# Patient Record
Sex: MALE | Race: WHITE | HISPANIC OR LATINO | URBAN - METROPOLITAN AREA
[De-identification: names, ages, dates, MRNs, and addresses within clinical notes are randomized per-mention and may not be internally consistent; named-entity substitution may affect disease eponyms.]

---

## 2023-10-22 ENCOUNTER — APPOINTMENT (OUTPATIENT)
Dept: RADIOLOGY | Facility: MEDICAL CENTER | Age: 63
End: 2023-10-22
Attending: EMERGENCY MEDICINE

## 2023-10-22 ENCOUNTER — HOSPITAL ENCOUNTER (EMERGENCY)
Facility: MEDICAL CENTER | Age: 63
End: 2023-10-22
Attending: EMERGENCY MEDICINE

## 2023-10-22 VITALS
HEART RATE: 67 BPM | WEIGHT: 184.3 LBS | TEMPERATURE: 98.4 F | RESPIRATION RATE: 15 BRPM | SYSTOLIC BLOOD PRESSURE: 142 MMHG | HEIGHT: 69 IN | OXYGEN SATURATION: 97 % | BODY MASS INDEX: 27.3 KG/M2 | DIASTOLIC BLOOD PRESSURE: 80 MMHG

## 2023-10-22 DIAGNOSIS — R33.9 URINARY RETENTION: ICD-10-CM

## 2023-10-22 DIAGNOSIS — N41.0 ACUTE PROSTATITIS: ICD-10-CM

## 2023-10-22 LAB
ALBUMIN SERPL BCP-MCNC: 4.3 G/DL (ref 3.2–4.9)
ALBUMIN/GLOB SERPL: 1.4 G/DL
ALP SERPL-CCNC: 156 U/L (ref 30–99)
ALT SERPL-CCNC: 44 U/L (ref 2–50)
ANION GAP SERPL CALC-SCNC: 10 MMOL/L (ref 7–16)
APPEARANCE UR: CLEAR
AST SERPL-CCNC: 37 U/L (ref 12–45)
BACTERIA #/AREA URNS HPF: NEGATIVE /HPF
BASOPHILS # BLD AUTO: 0.5 % (ref 0–1.8)
BASOPHILS # BLD: 0.05 K/UL (ref 0–0.12)
BILIRUB SERPL-MCNC: 0.5 MG/DL (ref 0.1–1.5)
BILIRUB UR QL STRIP.AUTO: ABNORMAL
BUN SERPL-MCNC: 17 MG/DL (ref 8–22)
CALCIUM ALBUM COR SERPL-MCNC: 8.9 MG/DL (ref 8.5–10.5)
CALCIUM SERPL-MCNC: 9.1 MG/DL (ref 8.5–10.5)
CHLORIDE SERPL-SCNC: 105 MMOL/L (ref 96–112)
CO2 SERPL-SCNC: 22 MMOL/L (ref 20–33)
COLOR UR: ABNORMAL
CREAT SERPL-MCNC: 0.56 MG/DL (ref 0.5–1.4)
EOSINOPHIL # BLD AUTO: 0.11 K/UL (ref 0–0.51)
EOSINOPHIL NFR BLD: 1.2 % (ref 0–6.9)
EPI CELLS #/AREA URNS HPF: NEGATIVE /HPF
ERYTHROCYTE [DISTWIDTH] IN BLOOD BY AUTOMATED COUNT: 44 FL (ref 35.9–50)
GFR SERPLBLD CREATININE-BSD FMLA CKD-EPI: 111 ML/MIN/1.73 M 2
GLOBULIN SER CALC-MCNC: 3 G/DL (ref 1.9–3.5)
GLUCOSE SERPL-MCNC: 113 MG/DL (ref 65–99)
GLUCOSE UR STRIP.AUTO-MCNC: NEGATIVE MG/DL
HCT VFR BLD AUTO: 42.2 % (ref 42–52)
HGB BLD-MCNC: 14.4 G/DL (ref 14–18)
HYALINE CASTS #/AREA URNS LPF: ABNORMAL /LPF
IMM GRANULOCYTES # BLD AUTO: 0.03 K/UL (ref 0–0.11)
IMM GRANULOCYTES NFR BLD AUTO: 0.3 % (ref 0–0.9)
KETONES UR STRIP.AUTO-MCNC: NEGATIVE MG/DL
LEUKOCYTE ESTERASE UR QL STRIP.AUTO: ABNORMAL
LYMPHOCYTES # BLD AUTO: 0.98 K/UL (ref 1–4.8)
LYMPHOCYTES NFR BLD: 10.6 % (ref 22–41)
MCH RBC QN AUTO: 30.6 PG (ref 27–33)
MCHC RBC AUTO-ENTMCNC: 34.1 G/DL (ref 32.3–36.5)
MCV RBC AUTO: 89.6 FL (ref 81.4–97.8)
MICRO URNS: ABNORMAL
MONOCYTES # BLD AUTO: 0.78 K/UL (ref 0–0.85)
MONOCYTES NFR BLD AUTO: 8.5 % (ref 0–13.4)
NEUTROPHILS # BLD AUTO: 7.28 K/UL (ref 1.82–7.42)
NEUTROPHILS NFR BLD: 78.9 % (ref 44–72)
NITRITE UR QL STRIP.AUTO: POSITIVE
NRBC # BLD AUTO: 0 K/UL
NRBC BLD-RTO: 0 /100 WBC (ref 0–0.2)
PH UR STRIP.AUTO: 5 [PH] (ref 5–8)
PLATELET # BLD AUTO: 293 K/UL (ref 164–446)
PMV BLD AUTO: 10.1 FL (ref 9–12.9)
POTASSIUM SERPL-SCNC: 4.1 MMOL/L (ref 3.6–5.5)
PROT SERPL-MCNC: 7.3 G/DL (ref 6–8.2)
PROT UR QL STRIP: NEGATIVE MG/DL
RBC # BLD AUTO: 4.71 M/UL (ref 4.7–6.1)
RBC # URNS HPF: ABNORMAL /HPF
RBC UR QL AUTO: ABNORMAL
SODIUM SERPL-SCNC: 137 MMOL/L (ref 135–145)
SP GR UR STRIP.AUTO: 1.02
UROBILINOGEN UR STRIP.AUTO-MCNC: 1 MG/DL
WBC # BLD AUTO: 9.2 K/UL (ref 4.8–10.8)
WBC #/AREA URNS HPF: ABNORMAL /HPF

## 2023-10-22 PROCEDURE — 87015 SPECIMEN INFECT AGNT CONCNTJ: CPT

## 2023-10-22 PROCEDURE — 700111 HCHG RX REV CODE 636 W/ 250 OVERRIDE (IP): Mod: JZ | Performed by: EMERGENCY MEDICINE

## 2023-10-22 PROCEDURE — 85025 COMPLETE CBC W/AUTO DIFF WBC: CPT

## 2023-10-22 PROCEDURE — 87040 BLOOD CULTURE FOR BACTERIA: CPT | Mod: 91

## 2023-10-22 PROCEDURE — 51798 US URINE CAPACITY MEASURE: CPT

## 2023-10-22 PROCEDURE — 51702 INSERT TEMP BLADDER CATH: CPT

## 2023-10-22 PROCEDURE — 81001 URINALYSIS AUTO W/SCOPE: CPT

## 2023-10-22 PROCEDURE — 87186 SC STD MICRODIL/AGAR DIL: CPT

## 2023-10-22 PROCEDURE — 80053 COMPREHEN METABOLIC PANEL: CPT

## 2023-10-22 PROCEDURE — 87077 CULTURE AEROBIC IDENTIFY: CPT

## 2023-10-22 PROCEDURE — 36415 COLL VENOUS BLD VENIPUNCTURE: CPT

## 2023-10-22 PROCEDURE — 96374 THER/PROPH/DIAG INJ IV PUSH: CPT

## 2023-10-22 PROCEDURE — 303105 HCHG CATHETER EXTRA

## 2023-10-22 PROCEDURE — 74176 CT ABD & PELVIS W/O CONTRAST: CPT

## 2023-10-22 PROCEDURE — 99284 EMERGENCY DEPT VISIT MOD MDM: CPT

## 2023-10-22 RX ORDER — CEFTRIAXONE 2 G/1
2000 INJECTION, POWDER, FOR SOLUTION INTRAMUSCULAR; INTRAVENOUS ONCE
Status: COMPLETED | OUTPATIENT
Start: 2023-10-22 | End: 2023-10-22

## 2023-10-22 RX ORDER — LEVOFLOXACIN 500 MG/1
500 TABLET, FILM COATED ORAL DAILY
Qty: 14 TABLET | Refills: 0 | Status: ON HOLD | OUTPATIENT
Start: 2023-10-22 | End: 2023-10-25

## 2023-10-22 RX ADMIN — CEFTRIAXONE SODIUM 2000 MG: 2 INJECTION, POWDER, FOR SOLUTION INTRAMUSCULAR; INTRAVENOUS at 05:33

## 2023-10-22 ASSESSMENT — PAIN DESCRIPTION - PAIN TYPE
TYPE: ACUTE PAIN
TYPE: ACUTE PAIN

## 2023-10-22 NOTE — ED TRIAGE NOTES
Chief Complaint   Patient presents with    Urinary Retention      used. 3-4 days of painful and difficult urination.  Patient provided with specimen cup, but repeatedly says he isn't able to get any urine out.         Patient educated on triage process. Informed to notified ED staff of change in symptoms.     Vitals:    10/22/23 0311   BP: (!) 170/91   Pulse: 77   Resp: 18   Temp: 36.2 °C (97.1 °F)   SpO2: 99%

## 2023-10-22 NOTE — ED NOTES
Bladder scan done at bedside per protocol. Pt straight cathed with sterile technique due to large volume retained in bladder.

## 2023-10-22 NOTE — ED NOTES
Inserted tenorio cathter with sterile technique. Tenorio care teaching provided to pt and pt's wife via Finnish language line . Pt verbalized understanding of teaching.

## 2023-10-22 NOTE — ED NOTES
Discharge teaching with paperwork regarding tenorio care and follow up with urology provided to pt. Pt verbalized understanding of teaching and all questions answered. Pt is A&Ox4 with stable vital signs, stable physical assessment, and PIV removed with catheter intact upon discharge. Pt ambulatory out of ED with steady gait with all personal belongings.

## 2023-10-22 NOTE — ED PROVIDER NOTES
ED Provider Note    Scribed for Polly Salas M.D. by Eva Paz. 10/22/2023, 3:59 AM.    Primary care provider: No primary care provider noted.  Means of arrival: Walk-In  History obtained from: Patient  History limited by: Botswanan    CHIEF COMPLAINT  Chief Complaint   Patient presents with    Urinary Retention      used. 3-4 days of painful and difficult urination.  Patient provided with specimen cup, but repeatedly says he isn't able to get any urine out.        HPI/ROS  Doyle Villa is a 63 y.o. male who presents to the Emergency Department for evaluation of acute urinary retention onset four days ago.  Patient states over the last few days he has had difficulty urinating and it is painful to urinate, he has a burning sensation.  He has been able to urinate a little bit at a time over the last few days.  However, beginning at around 11 PM last night, patient was unable to have any urine output. He tried taking Amoxicillin and Pyridium last night that he states he got from a friend who is a doctor. Despite medication, his symptoms continued to persist, prompting him to the ED today for further evaluation. Patient has associated dysuria. Denies any fevers, diarrhea, constipation, nausea, or vomiting. History of hypothyroidism. No known drug allergies.  No known medical problems.  He is otherwise generally healthy.    EXTERNAL RECORDS REVIEWED  No records to review.      LIMITATION TO HISTORY   Select: Language Botswanan,  Used     OUTSIDE HISTORIAN(S):  Significant other , who is at bedside and is able to help contribute to the patient's history      PAST MEDICAL HISTORY  History of hypothyroidism.     SURGICAL HISTORY  patient denies any surgical history    SOCIAL HISTORY  Social History     Tobacco Use    Smoking status: Every Day     Current packs/day: 0.50     Average packs/day: 0.5 packs/day for 15.0 years (7.5 ttl pk-yrs)     Types: Cigarettes     Start date:  "10/10/2008    Smokeless tobacco: Never   Substance Use Topics    Alcohol use: Yes     Alcohol/week: 2.4 oz     Types: 4 Cans of beer per week    Drug use: Never      Social History     Substance and Sexual Activity   Drug Use Never       FAMILY HISTORY  History reviewed. No pertinent family history.    CURRENT MEDICATIONS  Home Medications       Reviewed by Jayla Short R.N. (Registered Nurse) on 10/22/23 at 0324  Med List Status: Partial     Medication Last Dose Status        Patient Vish Taking any Medications                           ALLERGIES  No Known Allergies    PHYSICAL EXAM  VITAL SIGNS: BP (!) 170/91   Pulse 77   Temp 36.2 °C (97.1 °F) (Temporal)   Resp 18   Ht 1.75 m (5' 8.9\")   Wt 83.6 kg (184 lb 4.9 oz)   SpO2 99%   BMI 27.30 kg/m²   Vitals reviewed by myself.  Nursing note and vitals reviewed.  Constitutional: Well-developed and well-nourished.  Patient appears uncomfortable  HENT: Head is normocephalic and atraumatic.  Eyes: extra-ocular movements intact  Cardiovascular: Regular rate and regular rhythm. No murmur heard.  Pulmonary/Chest: Breath sounds normal. No wheezes or rales.   Abdominal: Soft with mild suprapubic tenderness, no rebound or guarding  Musculoskeletal: Extremities exhibit normal range of motion without edema or tenderness.   Neurological: Awake and alert  Skin: Skin is warm and dry. No rash.       DIAGNOSTIC STUDIES:  LABS  Labs Reviewed   CBC WITH DIFFERENTIAL - Abnormal; Notable for the following components:       Result Value    Neutrophils-Polys 78.90 (*)     Lymphocytes 10.60 (*)     Lymphs (Absolute) 0.98 (*)     All other components within normal limits   COMP METABOLIC PANEL - Abnormal; Notable for the following components:    Glucose 113 (*)     Alkaline Phosphatase 156 (*)     All other components within normal limits   URINALYSIS,CULTURE IF INDICATED - Abnormal; Notable for the following components:    Color Red (*)     Bilirubin Small (*)     Nitrite " "Positive (*)     Leukocyte Esterase Moderate (*)     Occult Blood Trace (*)     All other components within normal limits    Narrative:     Indication for culture:->Unexplained new onset of Flank pain  and/or Costovertebral angle tenderness  Release to patient->Immediate   URINE MICROSCOPIC (W/UA) - Abnormal; Notable for the following components:    WBC 0-2 (*)     RBC 5-10 (*)     All other components within normal limits    Narrative:     Indication for culture:->Unexplained new onset of Flank pain  and/or Costovertebral angle tenderness  Release to patient->Immediate   ESTIMATED GFR   BLOOD CULTURE    Narrative:     Per Hospital Policy: Only change Specimen Src: to \"Line\" if  specified by physician order.  Release to patient->Immediate   BLOOD CULTURE    Narrative:     Per Hospital Policy: Only change Specimen Src: to \"Line\" if  specified by physician order.  Release to patient->Immediate       All labs reviewed and independently interpreted by myself      RADIOLOGY    Final interpretation by radiology demonstrates:    CT-RENAL COLIC EVALUATION(A/P W/O)   Final Result         1. No urinary tract calculus identified. No renal collecting system dilatation.      2. Enlarged prostate. Fat stranding surrounding the seminal vesicles. Correlate for infection or inflammation.   3. Partially decompressed urinary bladder. No wall thickening.        The radiologist's interpretation of all radiological studies have been reviewed by me.      COURSE & MEDICAL DECISION MAKING    ED Observation Status? Yes; I am placing the patient in to an observation status due to a diagnostic uncertainty as well as therapeutic intensity. Patient placed in observation status at 4:01 AM, 10/22/2023.     Observation plan is as follows: We will manage their symptoms, evaluate with diagnostic testing, and then reassess after results are reviewed     Upon Reevaluation, the patient's condition has: Improved; and will be discharged.    Patient " discharged from ED Observation status at 7:11 AM (Time) 10/22/2023 (Date).     INITIAL ASSESSMENT AND PLAN    Patient is a 63-year-old male who presents for evaluation of abdominal discomfort and difficulty urinating.  Differential diagnosis includes urinary tract infection, benign prostatic hypertrophy, nephrolithiasis, pyelonephritis, TITA.  Diagnostic work-up includes labs and renal CT.       REASSESSMENTS   3:58 AM - Patient was seen and evaluated at bedside. Patient presents to the ED for urinary retention. After my exam, I discussed with the patient the plan of care, which includes treating the patient with medication for their symptoms, as well as obtaining lab work and imaging for further evaluation. Patient understands and verbalizes agreement to plan of care.     4:08 AM - Nurse preformed a bladder scan at bedside which showed 565 mL of urine.  Straight cath was performed which resolved patient's discomfort    6:31 AM - Patient was reevaluated at bedside. Discussed lab and radiology results with the patient and informed that they were consistent with prostatitis. I updated him on the plan of care, which includes having him try and urinate on his own. If he is unable to, we will have to place a tenorio catheter in. I will also give him a referral to Urology. I instructed him to stop taking the Amoxicillin. Patient understands and verbalizes agreement to plan of care.       7:11 AM - Patient was reevaluated at bedside. He has no further questions or concerns at this time. I then informed the patient of my plan for discharge, which includes strict return precautions for any new or worsening symptoms. Patient understands and verbalizes agreement to plan of care. Patient is comfortable going home at this time.        ER COURSE AND FINAL DISPOSITION   Patient's initial vitals notable for hypertension likely secondary to discomfort.  He has some mild suprapubic tenderness on exam.  Bladder scan reveals retained  urine of 565 mL and therefore straight cath was performed which resolved patient's discomfort.    Labs returned and are independently interpreted by myself to demonstrate:  -Urinalysis is nitrite positive with bacteria and leukocyte esterase, this is consistent with infection patient is given a dose of Rocephin in the emergency department.  -No leukocytosis  -Renal function is within normal limits  Labs otherwise unremarkable    CT returns and demonstrates enlarged prostate and fat stranding around the seminal vesicles consistent with likely infection.  Given his symptomatology I am concerned about prostatitis.  I will start him on Levaquin for 14 days.  He has been unable to urinate in the emergency department since initial straight cath and therefore we will also place a Burch catheter.  I will have him follow-up with urology for ongoing symptom management and Burch catheter removal.  Patient is amenable to this plan.  He is given strict return precautions and discharged in stable condition.    ADDITIONAL PROBLEM LIST AND RESOURCE UTILIZATION    Additional problems aside from the chief complaint that I have addressed: none    I have discussed management of the patient with the following physicians and LITLTE's: None    Discussion of management with other [facility] or appropriate source(s): None     Escalation of care considered, and ultimately not performed: see above.     Barriers to care at this time, including but not limited to: Patient does not have established PCP.     Decision tools and prescription drugs considered including, but not limited to: see above.    Please see review of records as noted above    Patient will be discharged home.    FOLLOW UP:  [facility]  [address] [id number]  [phone]  Schedule an appointment as soon as possible for a visit in 3 days        OUTPATIENT MEDICATIONS:  New Prescriptions    LEVOFLOXACIN (LEVAQUIN) 500 MG TABLET    Take 1 Tablet by mouth  every day for 14 days.         FINAL IMPRESSION  1. Urinary retention    2. Acute prostatitis          Eva GREEN (Scribe), am scribing for, and in the presence of, Polly Salas M.D..    Electronically signed by: Eva Paz (Scribe), 10/22/2023    Polly GREEN M.D. personally performed the services described in this documentation, as scribed by Eva Paz in my presence, and it is both accurate and complete.    The note accurately reflects work and decisions made by me.  Polly Salas M.D.  10/22/2023  12:49 PM

## 2023-10-23 ENCOUNTER — HOSPITAL ENCOUNTER (INPATIENT)
Facility: MEDICAL CENTER | Age: 63
LOS: 2 days | DRG: 690 | End: 2023-10-25
Attending: EMERGENCY MEDICINE | Admitting: STUDENT IN AN ORGANIZED HEALTH CARE EDUCATION/TRAINING PROGRAM

## 2023-10-23 ENCOUNTER — APPOINTMENT (OUTPATIENT)
Dept: CARDIOLOGY | Facility: MEDICAL CENTER | Age: 63
DRG: 690 | End: 2023-10-23
Attending: STUDENT IN AN ORGANIZED HEALTH CARE EDUCATION/TRAINING PROGRAM

## 2023-10-23 DIAGNOSIS — R78.81 POSITIVE BLOOD CULTURE: ICD-10-CM

## 2023-10-23 DIAGNOSIS — N39.0 URINARY TRACT INFECTION WITHOUT HEMATURIA, SITE UNSPECIFIED: ICD-10-CM

## 2023-10-23 DIAGNOSIS — N39.0 ACUTE UTI: ICD-10-CM

## 2023-10-23 DIAGNOSIS — E03.9 HYPOTHYROIDISM, UNSPECIFIED TYPE: ICD-10-CM

## 2023-10-23 DIAGNOSIS — R78.81 BACTEREMIA: ICD-10-CM

## 2023-10-23 DIAGNOSIS — N40.0 PROSTATE ENLARGEMENT: ICD-10-CM

## 2023-10-23 LAB
ALBUMIN SERPL BCP-MCNC: 4.4 G/DL (ref 3.2–4.9)
ALBUMIN/GLOB SERPL: 1.3 G/DL
ALP SERPL-CCNC: 162 U/L (ref 30–99)
ALT SERPL-CCNC: 44 U/L (ref 2–50)
ANION GAP SERPL CALC-SCNC: 12 MMOL/L (ref 7–16)
APPEARANCE UR: ABNORMAL
AST SERPL-CCNC: 31 U/L (ref 12–45)
BACTERIA #/AREA URNS HPF: NEGATIVE /HPF
BASOPHILS # BLD AUTO: 0.7 % (ref 0–1.8)
BASOPHILS # BLD: 0.05 K/UL (ref 0–0.12)
BILIRUB SERPL-MCNC: 0.4 MG/DL (ref 0.1–1.5)
BILIRUB UR QL STRIP.AUTO: NEGATIVE
BUN SERPL-MCNC: 17 MG/DL (ref 8–22)
CALCIUM ALBUM COR SERPL-MCNC: 9.3 MG/DL (ref 8.5–10.5)
CALCIUM SERPL-MCNC: 9.6 MG/DL (ref 8.5–10.5)
CAOX CRY #/AREA URNS HPF: ABNORMAL /HPF
CHLORIDE SERPL-SCNC: 104 MMOL/L (ref 96–112)
CK SERPL-CCNC: 106 U/L (ref 0–154)
CO2 SERPL-SCNC: 22 MMOL/L (ref 20–33)
COLOR UR: YELLOW
CREAT SERPL-MCNC: 0.72 MG/DL (ref 0.5–1.4)
EOSINOPHIL # BLD AUTO: 0.03 K/UL (ref 0–0.51)
EOSINOPHIL NFR BLD: 0.4 % (ref 0–6.9)
EPI CELLS #/AREA URNS HPF: NEGATIVE /HPF
ERYTHROCYTE [DISTWIDTH] IN BLOOD BY AUTOMATED COUNT: 45 FL (ref 35.9–50)
GFR SERPLBLD CREATININE-BSD FMLA CKD-EPI: 103 ML/MIN/1.73 M 2
GLOBULIN SER CALC-MCNC: 3.3 G/DL (ref 1.9–3.5)
GLUCOSE SERPL-MCNC: 123 MG/DL (ref 65–99)
GLUCOSE UR STRIP.AUTO-MCNC: NEGATIVE MG/DL
HCT VFR BLD AUTO: 48 % (ref 42–52)
HGB BLD-MCNC: 15.9 G/DL (ref 14–18)
HYALINE CASTS #/AREA URNS LPF: ABNORMAL /LPF
IMM GRANULOCYTES # BLD AUTO: 0.03 K/UL (ref 0–0.11)
IMM GRANULOCYTES NFR BLD AUTO: 0.4 % (ref 0–0.9)
KETONES UR STRIP.AUTO-MCNC: ABNORMAL MG/DL
LACTATE SERPL-SCNC: 1.3 MMOL/L (ref 0.5–2)
LEUKOCYTE ESTERASE UR QL STRIP.AUTO: NEGATIVE
LV EJECT FRACT  99904: 65
LV EJECT FRACT MOD 2C 99903: 69.44
LV EJECT FRACT MOD 4C 99902: 58.89
LV EJECT FRACT MOD BP 99901: 63.4
LYMPHOCYTES # BLD AUTO: 1.27 K/UL (ref 1–4.8)
LYMPHOCYTES NFR BLD: 18.2 % (ref 22–41)
MAGNESIUM SERPL-MCNC: 2.2 MG/DL (ref 1.5–2.5)
MCH RBC QN AUTO: 30.2 PG (ref 27–33)
MCHC RBC AUTO-ENTMCNC: 33.1 G/DL (ref 32.3–36.5)
MCV RBC AUTO: 91.3 FL (ref 81.4–97.8)
MICRO URNS: ABNORMAL
MONOCYTES # BLD AUTO: 0.9 K/UL (ref 0–0.85)
MONOCYTES NFR BLD AUTO: 12.9 % (ref 0–13.4)
NEUTROPHILS # BLD AUTO: 4.68 K/UL (ref 1.82–7.42)
NEUTROPHILS NFR BLD: 67.4 % (ref 44–72)
NITRITE UR QL STRIP.AUTO: NEGATIVE
NRBC # BLD AUTO: 0 K/UL
NRBC BLD-RTO: 0 /100 WBC (ref 0–0.2)
PH UR STRIP.AUTO: 5 [PH] (ref 5–8)
PHOSPHATE SERPL-MCNC: 3.2 MG/DL (ref 2.5–4.5)
PLATELET # BLD AUTO: 320 K/UL (ref 164–446)
PMV BLD AUTO: 9.4 FL (ref 9–12.9)
POTASSIUM SERPL-SCNC: 4.2 MMOL/L (ref 3.6–5.5)
PROCALCITONIN SERPL-MCNC: 0.27 NG/ML
PROT SERPL-MCNC: 7.7 G/DL (ref 6–8.2)
PROT UR QL STRIP: 30 MG/DL
RBC # BLD AUTO: 5.26 M/UL (ref 4.7–6.1)
RBC # URNS HPF: ABNORMAL /HPF
RBC UR QL AUTO: ABNORMAL
SODIUM SERPL-SCNC: 138 MMOL/L (ref 135–145)
SP GR UR STRIP.AUTO: 1.03
T4 FREE SERPL-MCNC: 0.83 NG/DL (ref 0.93–1.7)
TSH SERPL DL<=0.005 MIU/L-ACNC: 13.2 UIU/ML (ref 0.38–5.33)
UROBILINOGEN UR STRIP.AUTO-MCNC: 0.2 MG/DL
WBC # BLD AUTO: 7 K/UL (ref 4.8–10.8)
WBC #/AREA URNS HPF: ABNORMAL /HPF

## 2023-10-23 PROCEDURE — 82550 ASSAY OF CK (CPK): CPT

## 2023-10-23 PROCEDURE — 700111 HCHG RX REV CODE 636 W/ 250 OVERRIDE (IP): Mod: JZ | Performed by: STUDENT IN AN ORGANIZED HEALTH CARE EDUCATION/TRAINING PROGRAM

## 2023-10-23 PROCEDURE — 85025 COMPLETE CBC W/AUTO DIFF WBC: CPT

## 2023-10-23 PROCEDURE — 87040 BLOOD CULTURE FOR BACTERIA: CPT | Mod: 91

## 2023-10-23 PROCEDURE — 36415 COLL VENOUS BLD VENIPUNCTURE: CPT

## 2023-10-23 PROCEDURE — 93306 TTE W/DOPPLER COMPLETE: CPT | Mod: 26 | Performed by: INTERNAL MEDICINE

## 2023-10-23 PROCEDURE — 700105 HCHG RX REV CODE 258: Performed by: STUDENT IN AN ORGANIZED HEALTH CARE EDUCATION/TRAINING PROGRAM

## 2023-10-23 PROCEDURE — 83605 ASSAY OF LACTIC ACID: CPT

## 2023-10-23 PROCEDURE — 81001 URINALYSIS AUTO W/SCOPE: CPT

## 2023-10-23 PROCEDURE — 84439 ASSAY OF FREE THYROXINE: CPT

## 2023-10-23 PROCEDURE — 80053 COMPREHEN METABOLIC PANEL: CPT

## 2023-10-23 PROCEDURE — 87086 URINE CULTURE/COLONY COUNT: CPT

## 2023-10-23 PROCEDURE — 700102 HCHG RX REV CODE 250 W/ 637 OVERRIDE(OP): Performed by: STUDENT IN AN ORGANIZED HEALTH CARE EDUCATION/TRAINING PROGRAM

## 2023-10-23 PROCEDURE — 84100 ASSAY OF PHOSPHORUS: CPT

## 2023-10-23 PROCEDURE — 84145 PROCALCITONIN (PCT): CPT

## 2023-10-23 PROCEDURE — 84443 ASSAY THYROID STIM HORMONE: CPT

## 2023-10-23 PROCEDURE — 770006 HCHG ROOM/CARE - MED/SURG/GYN SEMI*

## 2023-10-23 PROCEDURE — 83735 ASSAY OF MAGNESIUM: CPT

## 2023-10-23 PROCEDURE — A9270 NON-COVERED ITEM OR SERVICE: HCPCS | Performed by: STUDENT IN AN ORGANIZED HEALTH CARE EDUCATION/TRAINING PROGRAM

## 2023-10-23 PROCEDURE — 99223 1ST HOSP IP/OBS HIGH 75: CPT | Performed by: STUDENT IN AN ORGANIZED HEALTH CARE EDUCATION/TRAINING PROGRAM

## 2023-10-23 PROCEDURE — 99285 EMERGENCY DEPT VISIT HI MDM: CPT

## 2023-10-23 PROCEDURE — 700111 HCHG RX REV CODE 636 W/ 250 OVERRIDE (IP): Mod: JZ | Performed by: INTERNAL MEDICINE

## 2023-10-23 PROCEDURE — 96365 THER/PROPH/DIAG IV INF INIT: CPT

## 2023-10-23 PROCEDURE — 700105 HCHG RX REV CODE 258: Performed by: INTERNAL MEDICINE

## 2023-10-23 PROCEDURE — 93306 TTE W/DOPPLER COMPLETE: CPT

## 2023-10-23 RX ORDER — SODIUM CHLORIDE 9 MG/ML
INJECTION, SOLUTION INTRAVENOUS CONTINUOUS
Status: ACTIVE | OUTPATIENT
Start: 2023-10-23 | End: 2023-10-24

## 2023-10-23 RX ORDER — ENOXAPARIN SODIUM 100 MG/ML
40 INJECTION SUBCUTANEOUS DAILY
Status: DISCONTINUED | OUTPATIENT
Start: 2023-10-23 | End: 2023-10-25 | Stop reason: HOSPADM

## 2023-10-23 RX ORDER — HYDROMORPHONE HYDROCHLORIDE 1 MG/ML
0.5 INJECTION, SOLUTION INTRAMUSCULAR; INTRAVENOUS; SUBCUTANEOUS EVERY 6 HOURS PRN
Status: ACTIVE | OUTPATIENT
Start: 2023-10-23 | End: 2023-10-25

## 2023-10-23 RX ORDER — LEVOTHYROXINE SODIUM 0.1 MG/1
100 TABLET ORAL
Status: DISCONTINUED | OUTPATIENT
Start: 2023-10-23 | End: 2023-10-25 | Stop reason: HOSPADM

## 2023-10-23 RX ORDER — PROCHLORPERAZINE EDISYLATE 5 MG/ML
5-10 INJECTION INTRAMUSCULAR; INTRAVENOUS EVERY 4 HOURS PRN
Status: DISCONTINUED | OUTPATIENT
Start: 2023-10-23 | End: 2023-10-25 | Stop reason: HOSPADM

## 2023-10-23 RX ORDER — ONDANSETRON 2 MG/ML
4 INJECTION INTRAMUSCULAR; INTRAVENOUS EVERY 4 HOURS PRN
Status: DISCONTINUED | OUTPATIENT
Start: 2023-10-23 | End: 2023-10-25 | Stop reason: HOSPADM

## 2023-10-23 RX ORDER — BISACODYL 10 MG
10 SUPPOSITORY, RECTAL RECTAL
Status: DISCONTINUED | OUTPATIENT
Start: 2023-10-23 | End: 2023-10-25 | Stop reason: HOSPADM

## 2023-10-23 RX ORDER — POLYETHYLENE GLYCOL 3350 17 G/17G
1 POWDER, FOR SOLUTION ORAL
Status: DISCONTINUED | OUTPATIENT
Start: 2023-10-23 | End: 2023-10-25 | Stop reason: HOSPADM

## 2023-10-23 RX ORDER — LABETALOL HYDROCHLORIDE 5 MG/ML
10 INJECTION, SOLUTION INTRAVENOUS EVERY 4 HOURS PRN
Status: DISCONTINUED | OUTPATIENT
Start: 2023-10-23 | End: 2023-10-25 | Stop reason: HOSPADM

## 2023-10-23 RX ORDER — ACETAMINOPHEN 325 MG/1
650 TABLET ORAL EVERY 6 HOURS PRN
Status: DISCONTINUED | OUTPATIENT
Start: 2023-10-23 | End: 2023-10-25 | Stop reason: HOSPADM

## 2023-10-23 RX ORDER — CEFTRIAXONE 2 G/1
2000 INJECTION, POWDER, FOR SOLUTION INTRAMUSCULAR; INTRAVENOUS ONCE
Status: DISCONTINUED | OUTPATIENT
Start: 2023-10-23 | End: 2023-10-23

## 2023-10-23 RX ORDER — TAMSULOSIN HYDROCHLORIDE 0.4 MG/1
0.4 CAPSULE ORAL
Status: DISCONTINUED | OUTPATIENT
Start: 2023-10-23 | End: 2023-10-25 | Stop reason: HOSPADM

## 2023-10-23 RX ORDER — PROMETHAZINE HYDROCHLORIDE 25 MG/1
12.5-25 TABLET ORAL EVERY 4 HOURS PRN
Status: DISCONTINUED | OUTPATIENT
Start: 2023-10-23 | End: 2023-10-25 | Stop reason: HOSPADM

## 2023-10-23 RX ORDER — PROMETHAZINE HYDROCHLORIDE 25 MG/1
12.5-25 SUPPOSITORY RECTAL EVERY 4 HOURS PRN
Status: DISCONTINUED | OUTPATIENT
Start: 2023-10-23 | End: 2023-10-25 | Stop reason: HOSPADM

## 2023-10-23 RX ORDER — AMOXICILLIN 250 MG
2 CAPSULE ORAL 2 TIMES DAILY
Status: DISCONTINUED | OUTPATIENT
Start: 2023-10-23 | End: 2023-10-25 | Stop reason: HOSPADM

## 2023-10-23 RX ORDER — ONDANSETRON 4 MG/1
4 TABLET, ORALLY DISINTEGRATING ORAL EVERY 4 HOURS PRN
Status: DISCONTINUED | OUTPATIENT
Start: 2023-10-23 | End: 2023-10-25 | Stop reason: HOSPADM

## 2023-10-23 RX ADMIN — LEVOTHYROXINE SODIUM 100 MCG: 0.1 TABLET ORAL at 14:51

## 2023-10-23 RX ADMIN — TAMSULOSIN HYDROCHLORIDE 0.4 MG: 0.4 CAPSULE ORAL at 14:51

## 2023-10-23 RX ADMIN — CEFEPIME 2 G: 2 INJECTION, POWDER, FOR SOLUTION INTRAVENOUS at 12:37

## 2023-10-23 RX ADMIN — ENOXAPARIN SODIUM 40 MG: 100 INJECTION SUBCUTANEOUS at 18:41

## 2023-10-23 RX ADMIN — SODIUM CHLORIDE: 9 INJECTION, SOLUTION INTRAVENOUS at 14:37

## 2023-10-23 ASSESSMENT — COGNITIVE AND FUNCTIONAL STATUS - GENERAL
DAILY ACTIVITIY SCORE: 24
SUGGESTED CMS G CODE MODIFIER DAILY ACTIVITY: CH
SUGGESTED CMS G CODE MODIFIER MOBILITY: CH
MOBILITY SCORE: 24

## 2023-10-23 ASSESSMENT — ENCOUNTER SYMPTOMS
SHORTNESS OF BREATH: 0
COUGH: 0

## 2023-10-23 ASSESSMENT — LIFESTYLE VARIABLES
CONSUMPTION TOTAL: NEGATIVE
HOW MANY TIMES IN THE PAST YEAR HAVE YOU HAD 5 OR MORE DRINKS IN A DAY: 0
HAVE PEOPLE ANNOYED YOU BY CRITICIZING YOUR DRINKING: NO
DOES PATIENT WANT TO STOP DRINKING: NO
ON A TYPICAL DAY WHEN YOU DRINK ALCOHOL HOW MANY DRINKS DO YOU HAVE: 0
AVERAGE NUMBER OF DAYS PER WEEK YOU HAVE A DRINK CONTAINING ALCOHOL: 0
HAVE YOU EVER FELT YOU SHOULD CUT DOWN ON YOUR DRINKING: NO
ALCOHOL_USE: NO
TOTAL SCORE: 0
EVER HAD A DRINK FIRST THING IN THE MORNING TO STEADY YOUR NERVES TO GET RID OF A HANGOVER: NO
TOTAL SCORE: 0
EVER FELT BAD OR GUILTY ABOUT YOUR DRINKING: NO
TOTAL SCORE: 0

## 2023-10-23 ASSESSMENT — FIBROSIS 4 INDEX
FIB4 SCORE: 0.91
FIB4 SCORE: 1.18

## 2023-10-23 ASSESSMENT — PATIENT HEALTH QUESTIONNAIRE - PHQ9
2. FEELING DOWN, DEPRESSED, IRRITABLE, OR HOPELESS: NOT AT ALL
1. LITTLE INTEREST OR PLEASURE IN DOING THINGS: NOT AT ALL
SUM OF ALL RESPONSES TO PHQ9 QUESTIONS 1 AND 2: 0

## 2023-10-23 ASSESSMENT — PAIN DESCRIPTION - PAIN TYPE: TYPE: ACUTE PAIN

## 2023-10-23 NOTE — ED NOTES
ECHO to bedside    Niacinamide Pregnancy And Lactation Text: These medications are considered safe during pregnancy.

## 2023-10-23 NOTE — PROGRESS NOTES
4 Eyes Skin Assessment Completed by TRACEE Le and TRACEE Weiner.    Head WDL  Ears WDL  Nose WDL  Mouth WDL  Neck WDL  Breast/Chest WDL  Shoulder Blades WDL  Spine WDL  (R) Arm/Elbow/Hand WDL  (L) Arm/Elbow/Hand WDL  Abdomen Scar  Groin WDL  Scrotum/Coccyx/Buttocks WDL  (R) Leg WDL  (L) Leg WDL  (R) Heel/Foot/Toe WDL  (L) Heel/Foot/Toe WDL          Devices In Places Burch      Interventions In Place Pillows    Possible Skin Injury No    Pictures Uploaded Into Epic N/A  Wound Consult Placed N/A  RN Wound Prevention Protocol Ordered Yes

## 2023-10-23 NOTE — ASSESSMENT & PLAN NOTE
1/2 blood culture growing Serratia marcescens.  Continue IV fluid  Continue IV cefepime  Follow repeat blood culture

## 2023-10-23 NOTE — ED TRIAGE NOTES
Chief Complaint   Patient presents with    Groin Pain     Pt to triage with complaints of worsening groin pain. Pt reports to have Urology follow up but pain worse and was sent to [facility] for further eval.     used for triage. Pt states to have had catheter placed on Sat, was called today by Urology and told to come to [facility] for further eval, possible infection. Pt states to be having pressure in his bladder.   Explained to pt triage process, made pt aware to tell this RN/staff of any changes/concerns, pt verbalized understanding of process and instructions given. Pt to ER lucian.

## 2023-10-23 NOTE — ED PROVIDER NOTES
"  ER Provider Note    Scribed for Trev Michelle M.D. by Kaylah Pacheco. 10/23/2023   11:11 AM    Primary Care Provider: Pcp Pt States None    CHIEF COMPLAINT  Chief Complaint   Patient presents with    Groin Pain     Pt to triage with complaints of worsening groin pain. Pt reports to have Urology follow up but pain worse and was sent to [facility] for further eval.      EXTERNAL RECORDS REVIEWED  Outpatient Notes: The patient was seen yesterday for urinary retention at that time he had blood cultures drawn. 1 of 2 was positive for gram negative rods, patient was called and asked to return to the ED.     HPI/ROS  LIMITATION TO HISTORY   Select: : None  OUTSIDE HISTORIAN(S):  Family Doyle Villa is a 63 y.o. male who presents to the ED for evaluation of mild groin pain. Patient had catheter placed after having urinary retention. Patient states called today to present to the ED for abnormal labs. He reports having pressure/cramping in his bladder. He adds that he has been on antibiotics and overall feels better. No alleviating factors attempted. He denies any vomiting    PAST MEDICAL HISTORY  History reviewed. No pertinent past medical history.    SURGICAL HISTORY  History reviewed. No pertinent surgical history.    FAMILY HISTORY  None noted    SOCIAL HISTORY   reports that he has been smoking cigarettes. He started smoking about 15 years ago. He has a 7.5 pack-year smoking history. He has never used smokeless tobacco. He reports current alcohol use of about 2.4 oz of alcohol per week. He reports that he does not use drugs.    CURRENT MEDICATIONS  Previous Medications    LEVOFLOXACIN (LEVAQUIN) 500 MG TABLET    Take 1 Tablet by mouth every day for 14 days.       ALLERGIES  No Known Allergies     PHYSICAL EXAM  BP (!) 149/93   Pulse 91   Temp 36.3 °C (97.3 °F) (Temporal)   Resp 18   Ht 1.727 m (5' 8\")   Wt 83.5 kg (184 lb)   SpO2 96%   BMI 27.98 kg/m²      Nursing note and vitals " reviewed.  Constitutional: Well-developed and well-nourished. No distress.   HENT: Head is normocephalic and atraumatic. Oropharynx is clear and moist without exudate or erythema.   Eyes: Pupils are equal, round, and reactive to light. Conjunctiva are normal.   Cardiovascular: Normal rate and regular rhythm. No murmur heard. Normal radial pulses.  Pulmonary/Chest: Breath sounds normal. No wheezes or rales.   Abdominal: Soft and non-tender. No distention, tenorio catheter in place, draining clear yellow urine  Musculoskeletal: Extremities exhibit normal range of motion without edema or tenderness.   Neurological: Awake, alert and oriented to person, place, and time. No focal deficits noted.  Skin: Skin is warm and dry. No rash.   Psychiatric: Normal mood and affect. Appropriate for clinical situation    DIAGNOSTIC STUDIES    Labs:   Results for orders placed or performed during the hospital encounter of 10/23/23   LACTIC ACID   Result Value Ref Range    Lactic Acid 1.3 0.5 - 2.0 mmol/L   CBC WITH DIFFERENTIAL   Result Value Ref Range    WBC 7.0 4.8 - 10.8 K/uL    RBC 5.26 4.70 - 6.10 M/uL    Hemoglobin 15.9 14.0 - 18.0 g/dL    Hematocrit 48.0 42.0 - 52.0 %    MCV 91.3 81.4 - 97.8 fL    MCH 30.2 27.0 - 33.0 pg    MCHC 33.1 32.3 - 36.5 g/dL    RDW 45.0 35.9 - 50.0 fL    Platelet Count 320 164 - 446 K/uL    MPV 9.4 9.0 - 12.9 fL    Neutrophils-Polys 67.40 44.00 - 72.00 %    Lymphocytes 18.20 (L) 22.00 - 41.00 %    Monocytes 12.90 0.00 - 13.40 %    Eosinophils 0.40 0.00 - 6.90 %    Basophils 0.70 0.00 - 1.80 %    Immature Granulocytes 0.40 0.00 - 0.90 %    Nucleated RBC 0.00 0.00 - 0.20 /100 WBC    Neutrophils (Absolute) 4.68 1.82 - 7.42 K/uL    Lymphs (Absolute) 1.27 1.00 - 4.80 K/uL    Monos (Absolute) 0.90 (H) 0.00 - 0.85 K/uL    Eos (Absolute) 0.03 0.00 - 0.51 K/uL    Baso (Absolute) 0.05 0.00 - 0.12 K/uL    Immature Granulocytes (abs) 0.03 0.00 - 0.11 K/uL    NRBC (Absolute) 0.00 K/uL   COMP METABOLIC PANEL   Result  Value Ref Range    Sodium 138 135 - 145 mmol/L    Potassium 4.2 3.6 - 5.5 mmol/L    Chloride 104 96 - 112 mmol/L    Co2 22 20 - 33 mmol/L    Anion Gap 12.0 7.0 - 16.0    Glucose 123 (H) 65 - 99 mg/dL    Bun 17 8 - 22 mg/dL    Creatinine 0.72 0.50 - 1.40 mg/dL    Calcium 9.6 8.5 - 10.5 mg/dL    Correct Calcium 9.3 8.5 - 10.5 mg/dL    AST(SGOT) 31 12 - 45 U/L    ALT(SGPT) 44 2 - 50 U/L    Alkaline Phosphatase 162 (H) 30 - 99 U/L    Total Bilirubin 0.4 0.1 - 1.5 mg/dL    Albumin 4.4 3.2 - 4.9 g/dL    Total Protein 7.7 6.0 - 8.2 g/dL    Globulin 3.3 1.9 - 3.5 g/dL    A-G Ratio 1.3 g/dL   URINALYSIS    Specimen: Urine   Result Value Ref Range    Color Yellow     Character Cloudy (A)     Specific Gravity 1.028 <1.035    Ph 5.0 5.0 - 8.0    Glucose Negative Negative mg/dL    Ketones Trace (A) Negative mg/dL    Protein 30 (A) Negative mg/dL    Bilirubin Negative Negative    Urobilinogen, Urine 0.2 Negative    Nitrite Negative Negative    Leukocyte Esterase Negative Negative    Occult Blood Large (A) Negative    Micro Urine Req Microscopic    ESTIMATED GFR   Result Value Ref Range    GFR (CKD-EPI) 103 >60 mL/min/1.73 m 2   MAGNESIUM   Result Value Ref Range    Magnesium 2.2 1.5 - 2.5 mg/dL   PHOSPHORUS   Result Value Ref Range    Phosphorus 3.2 2.5 - 4.5 mg/dL   CREATINE KINASE   Result Value Ref Range    CPK Total 106 0 - 154 U/L   TSH WITH REFLEX TO FT4   Result Value Ref Range    TSH 13.200 (H) 0.380 - 5.330 uIU/mL   URINE MICROSCOPIC (W/UA)   Result Value Ref Range    WBC 2-5 (A) /hpf    RBC 5-10 (A) /hpf    Bacteria Negative None /hpf    Epithelial Cells Negative /hpf    Ca Oxalate Crystal Moderate /hpf    Hyaline Cast 0-2 /lpf       INITIAL ASSESSMENT AND PLAN    11:11 AM - Patient was evaluated at bedside for groin pain. Ordered for Lactic Acid, CBC with diff, CMP, UA, UA Culture and Blood Culture to evaluate. I informed him of the plan for hospitalization for IV antibiotics. The patient will be medicated with  Rocephin 2,000 mg for his symptoms. Patient verbalizes understanding and support with my plan of care. The patient returns to the ED with bacteremia and positive blood culture. The patient will be treated with antibiotics as per pharmacy recommendation.    ED Observation Status? No; Patient does not meet criteria for ED Observation.      COURSE AND MEDICAL DECISION MAKING    11:43 AM - I discussed the patient's case and the above findings with Dr. Cespedes (hospitalist) who will consult on the patient for hospitalization.    Patient presents today with a history of urinary retention urinary tract infection.  Blood cultures positive for gram-negative bacteria consistent with bacteremia.  I do not hear any murmur on exam to make me concerned for endocarditis.    DISPOSITION AND DISCUSSIONS    I have discussed management of the patient with the following physicians and LITTLE's:  None noted    Discussion of management with other [facility] or appropriate source(s): None     DISPOSITION:  Patient will be hospitalized by Dr. Cespedes in guarded condition.    FINAL DIAGNOSIS  1. Bacteremia    2. Urinary tract infection without hematuria, site unspecified    3. Positive blood culture         IKaylah (Patrice), am scribing for, and in the presence of, Trev Michelle M.D..    Electronically signed by: Kaylah Pacheco (Omaribclement), 10/23/2023    ITrev M.D. personally performed the services described in this documentation, as scribed by Kaylah Pacheco in my presence, and it is both accurate and complete.      The note accurately reflects work and decisions made by me.  Trev Michelle M.D.  10/23/2023  3:16 PM

## 2023-10-23 NOTE — H&P
Hospital Medicine History & Physical Note    Date of Service  10/23/2023    Primary Care Physician  Pcp Pt States None      Code Status  Full Code    Chief Complaint  Chief Complaint   Patient presents with    Groin Pain     Pt to triage with complaints of worsening groin pain. Pt reports to have Urology follow up but pain worse and was sent to [facility] for further eval.        History of Presenting Illness  Doyle Villa is a 63 y.o. male with past medical history of hypothyroidism who presented 10/23/2023 for evaluation of bacteremia.  Patient presented on 10/22 to ED with UTI and urine retention.  Patient was discharged in the ED on Burch and levofloxacin.  Today he received call from pharmacist for bacteremia blood culture growing gram-negative rose.  Patient does report of dysuria.  Denies fever, chest pain, cough, shortness of breath.    On arrival to ED his vitals remained stable.  Labs reviewed noted normal white count, UA positive for UTI. 1/2 blood culture growing Serratia marcescens.  CT renal noted with enlarged prostate,Fat stranding surrounding the seminal vesicles.      I spoke and discussed the case with the ER physician, Dr. Michelle   Patient will be admitted to the hospital for further evaluation and management for acute UTI and bacteremia.  He will require IV antibiotics and blood culture monitoring.  Eventual need ID evaluation.   Need voiding trial during hospitalization        I discussed the plan of care with patient and family.    Review of Systems  Review of Systems   Constitutional:  Positive for malaise/fatigue.   Respiratory:  Negative for cough and shortness of breath.    Cardiovascular:  Negative for chest pain.   Genitourinary:  Positive for dysuria, frequency, hematuria and urgency.       Past Medical History   has no past medical history on file.    Surgical History   has no past surgical history on file.     Family History  family history is not on file.   Family history  reviewed with patient. There is no family history that is pertinent to the chief complaint.     Social History   reports that he has been smoking cigarettes. He started smoking about 15 years ago. He has a 7.5 pack-year smoking history. He has never used smokeless tobacco. He reports current alcohol use of about 2.4 oz of alcohol per week. He reports that he does not use drugs.    Allergies  No Known Allergies    Medications  Prior to Admission Medications   Prescriptions Last Dose Informant Patient Reported? Taking?   levoFLOXacin (LEVAQUIN) 500 MG tablet   No No   Sig: Take 1 Tablet by mouth every day for 14 days.      Facility-Administered Medications: None       Physical Exam  Temp:  [36.3 °C (97.3 °F)] 36.3 °C (97.3 °F)  Pulse:  [91] 91  Resp:  [18] 18  BP: (149)/(93) 149/93  SpO2:  [96 %] 96 %  Blood Pressure: (!) 149/93   Temperature: 36.3 °C (97.3 °F)   Pulse: 91   Respiration: 18   Pulse Oximetry: 96 %       Physical Exam  Constitutional:       Appearance: He is ill-appearing.   Cardiovascular:      Rate and Rhythm: Normal rate and regular rhythm.      Pulses: Normal pulses.      Heart sounds: Normal heart sounds.   Pulmonary:      Effort: Pulmonary effort is normal.      Breath sounds: Normal breath sounds.   Abdominal:      General: Abdomen is flat.   Musculoskeletal:      Right lower leg: No edema.      Left lower leg: No edema.   Skin:     General: Skin is warm.   Neurological:      General: No focal deficit present.      Mental Status: He is alert.   Psychiatric:         Mood and Affect: Mood normal.         Laboratory:  Recent Labs     10/22/23  0412 10/23/23  1111   WBC 9.2 7.0   RBC 4.71 5.26   HEMOGLOBIN 14.4 15.9   HEMATOCRIT 42.2 48.0   MCV 89.6 91.3   MCH 30.6 30.2   MCHC 34.1 33.1   RDW 44.0 45.0   PLATELETCT 293 320   MPV 10.1 9.4     Recent Labs     10/22/23  0412 10/23/23  1111   SODIUM 137 138   POTASSIUM 4.1 4.2   CHLORIDE 105 104   CO2 22 22   GLUCOSE 113* 123*   BUN 17 17   CREATININE  "0.56 0.72   CALCIUM 9.1 9.6     Recent Labs     10/22/23  0412 10/23/23  1111   ALTSGPT 44 44   ASTSGOT 37 31   ALKPHOSPHAT 156* 162*   TBILIRUBIN 0.5 0.4   GLUCOSE 113* 123*         No results for input(s): \"NTPROBNP\" in the last 72 hours.      No results for input(s): \"TROPONINT\" in the last 72 hours.    Imaging:  EC-ECHOCARDIOGRAM COMPLETE W/O CONT    (Results Pending)       no X-Ray or EKG requiring interpretation    Assessment/Plan:  Justification for Admission Status  I anticipate this patient will require at least two midnights for appropriate medical management, necessitating inpatient admission   or further evaluation and management for acute UTI and bacteremia.  He will require IV antibiotics and blood culture monitoring.  Eventual need ID evaluation. Need voiding trial during hospitalization      * Bacteremia- (present on admission)  Assessment & Plan  1/2 blood culture growing Serratia marcescens.  Continue IV fluid  Continue Rocephin  Follow repeat blood culture      Hypothyroidism  Assessment & Plan  Started on levothyroxine  Check thyroid panel    Prostate enlargement  Assessment & Plan  Currently on Burch  Added Flomax  Voiding trial during hospitalization    Acute UTI  Assessment & Plan  Continue Rocephin  Follow urine culture  Requiring IV narcotics for pain management  Monitor for respiratory while on IV narcotics        VTE prophylaxis: SCDs/TEDs and enoxaparin ppx    I independently reviewed pertinent clinical lab tests since admission and ordered additional follow up clinical lab tests.  Admission order was placed.  Labs ordered for follow-up.  I independently reviewed pertinent radiographic images and the radiologist's reports since admission and ordered additional follow up radiographic studies.  The details of the available patient records in [facility] (including laboratory tests, culture data, medications, imaging, and other pertinent diagnostic tests) and that information was utilized as " warranted in today's medical decision making for this patient.  I personally evaluated the patient condition at bedside.     Care interventions include:   Review of interval medical and surgical history, current medications and outpatient medication reconciliation, interval imaging studies and radiologist interpretation, and interval laboratory values.

## 2023-10-23 NOTE — CONSULTS
"ID consult for empiric abx recommendation  UTI with gram neg bacteremia  Bcx +Serratia  Of note given levo as outpatient and \"feels better\"  Switch to cefepime  Final antibiotic recommendations per culture results and clinical course  DW Dr Hendrix      "

## 2023-10-23 NOTE — ED NOTES
Urine collected. Pt complaining of urine leaking around his catheter. Attempted to reposition catheter. Bladder scan of 34ml noted. Emptied catheter bag and will observe urine output.

## 2023-10-23 NOTE — ASSESSMENT & PLAN NOTE
Continue IV cefepime  Follow urine culture  Requiring IV narcotics for pain management  Monitor for respiratory while on IV narcotics

## 2023-10-24 LAB
ANION GAP SERPL CALC-SCNC: 9 MMOL/L (ref 7–16)
BACTERIA BLD CULT: ABNORMAL
BACTERIA BLD CULT: ABNORMAL
BUN SERPL-MCNC: 17 MG/DL (ref 8–22)
CALCIUM SERPL-MCNC: 9.1 MG/DL (ref 8.5–10.5)
CHLORIDE SERPL-SCNC: 107 MMOL/L (ref 96–112)
CO2 SERPL-SCNC: 25 MMOL/L (ref 20–33)
CREAT SERPL-MCNC: 0.77 MG/DL (ref 0.5–1.4)
ERYTHROCYTE [DISTWIDTH] IN BLOOD BY AUTOMATED COUNT: 44.7 FL (ref 35.9–50)
GFR SERPLBLD CREATININE-BSD FMLA CKD-EPI: 101 ML/MIN/1.73 M 2
GLUCOSE SERPL-MCNC: 108 MG/DL (ref 65–99)
HCT VFR BLD AUTO: 45.1 % (ref 42–52)
HGB BLD-MCNC: 14.7 G/DL (ref 14–18)
MCH RBC QN AUTO: 29.8 PG (ref 27–33)
MCHC RBC AUTO-ENTMCNC: 32.6 G/DL (ref 32.3–36.5)
MCV RBC AUTO: 91.3 FL (ref 81.4–97.8)
PLATELET # BLD AUTO: 303 K/UL (ref 164–446)
PMV BLD AUTO: 9.4 FL (ref 9–12.9)
POTASSIUM SERPL-SCNC: 4.7 MMOL/L (ref 3.6–5.5)
RBC # BLD AUTO: 4.94 M/UL (ref 4.7–6.1)
SIGNIFICANT IND 70042: ABNORMAL
SITE SITE: ABNORMAL
SODIUM SERPL-SCNC: 141 MMOL/L (ref 135–145)
SOURCE SOURCE: ABNORMAL
WBC # BLD AUTO: 6.3 K/UL (ref 4.8–10.8)

## 2023-10-24 PROCEDURE — 99233 SBSQ HOSP IP/OBS HIGH 50: CPT | Performed by: INTERNAL MEDICINE

## 2023-10-24 PROCEDURE — 700105 HCHG RX REV CODE 258: Performed by: STUDENT IN AN ORGANIZED HEALTH CARE EDUCATION/TRAINING PROGRAM

## 2023-10-24 PROCEDURE — A9270 NON-COVERED ITEM OR SERVICE: HCPCS | Performed by: STUDENT IN AN ORGANIZED HEALTH CARE EDUCATION/TRAINING PROGRAM

## 2023-10-24 PROCEDURE — 700102 HCHG RX REV CODE 250 W/ 637 OVERRIDE(OP): Performed by: STUDENT IN AN ORGANIZED HEALTH CARE EDUCATION/TRAINING PROGRAM

## 2023-10-24 PROCEDURE — 770006 HCHG ROOM/CARE - MED/SURG/GYN SEMI*

## 2023-10-24 PROCEDURE — 80048 BASIC METABOLIC PNL TOTAL CA: CPT

## 2023-10-24 PROCEDURE — 700111 HCHG RX REV CODE 636 W/ 250 OVERRIDE (IP): Mod: JZ | Performed by: INTERNAL MEDICINE

## 2023-10-24 PROCEDURE — 36415 COLL VENOUS BLD VENIPUNCTURE: CPT

## 2023-10-24 PROCEDURE — 700105 HCHG RX REV CODE 258: Performed by: INTERNAL MEDICINE

## 2023-10-24 PROCEDURE — 85027 COMPLETE CBC AUTOMATED: CPT

## 2023-10-24 RX ADMIN — LEVOTHYROXINE SODIUM 100 MCG: 0.1 TABLET ORAL at 04:41

## 2023-10-24 RX ADMIN — SODIUM CHLORIDE: 9 INJECTION, SOLUTION INTRAVENOUS at 10:41

## 2023-10-24 RX ADMIN — DOCUSATE SODIUM 50 MG AND SENNOSIDES 8.6 MG 2 TABLET: 8.6; 5 TABLET, FILM COATED ORAL at 04:41

## 2023-10-24 RX ADMIN — SODIUM CHLORIDE: 9 INJECTION, SOLUTION INTRAVENOUS at 00:22

## 2023-10-24 RX ADMIN — TAMSULOSIN HYDROCHLORIDE 0.4 MG: 0.4 CAPSULE ORAL at 08:53

## 2023-10-24 RX ADMIN — CEFEPIME 2 G: 2 INJECTION, POWDER, FOR SOLUTION INTRAVENOUS at 04:45

## 2023-10-24 RX ADMIN — CEFEPIME 2 G: 2 INJECTION, POWDER, FOR SOLUTION INTRAVENOUS at 17:44

## 2023-10-24 NOTE — PROGRESS NOTES
Received report from Cristina EASLEY. Assumed care at 0700  Patient a & o x 4. Pt [place] speaking.  ipad used.   Tenorio for retention.   Pt denies burning at tenorio site, no abd/groin pain and pt with good urine OP of clear, yellow urine.  Pt Tolerating regular diet  no skin breakdown  Patient up self. Gait steady.  Patient oriented to room, surroundings and call bell. Bed alarm off. Bed in lowest position, locked and upper side rails up. Patient demonstrated correct use of call bell. Call bell/belongings within reach. Patient educated on hourly rounding.   Plan   IV abx; infection prevention

## 2023-10-24 NOTE — CARE PLAN
The patient is Stable - Low risk of patient condition declining or worsening         Progress made toward(s) clinical / shift: Pt admitted from ED this afternoon. AXOX4. Vitals stable, ambulates with steady gait. Denies pain or discomfort. Came from home with MD jona messaged to clarify if he wants to remove or place order. MD would like to keep jona in place. Pt resting in bed with call light in reach.    Patient is not progressing towards the following goals:

## 2023-10-24 NOTE — CARE PLAN
Problem: Communication  Goal: The ability to communicate needs accurately and effectively will improve  Outcome: Progressing  Flowsheets (Taken 10/24/2023 1600)  Communication:   Assessed patient's ability to understand and communicate   Oriented family/support system to call light   Reoriented patient to environment   Oriented patient to call light   Utilized /language line     Problem: Infection - Standard  Goal: Patient will remain free from infection  Outcome: Progressing   The patient is Stable - Low risk of patient condition declining or worsening    Shift Goals  Clinical Goals: iv abx; Indonesian interpretor  Patient Goals: poc  Family Goals: poc    Progress made toward(s) clinical / shift goals:  used for education and medications. IV abx as ordered. Pt afebrile. Pt denies fever/chills. Good hand hygiene before and after pt contact.     Patient is not progressing towards the following goals:

## 2023-10-24 NOTE — CARE PLAN
The patient is Stable - Low risk of patient condition declining or worsening    Shift Goals  Clinical Goals: Urinary care  Patient Goals: Rest    Progress made toward(s) clinical / shift goals: Pt has tenorio leg bag in place; tenorio clean dry, and intact.       Problem: Knowledge Deficit - Standard  Goal: Patient and family/care givers will demonstrate understanding of plan of care, disease process/condition, diagnostic tests and medications  Description: Target End Date:  1-3 days or as soon as patient condition allows    Document in Patient Education    1.  Patient and family/caregiver oriented to unit, equipment, visitation policy and means for communicating concern  2.  Complete/review Learning Assessment  3.  Assess knowledge level of disease process/condition, treatment plan, diagnostic tests and medications  4.  Explain disease process/condition, treatment plan, diagnostic tests and medications  Outcome: Progressing       Patient is not progressing towards the following goals:

## 2023-10-25 ENCOUNTER — PHARMACY VISIT (OUTPATIENT)
Dept: PHARMACY | Facility: MEDICAL CENTER | Age: 63
End: 2023-10-25
Payer: COMMERCIAL

## 2023-10-25 VITALS
TEMPERATURE: 97 F | SYSTOLIC BLOOD PRESSURE: 113 MMHG | BODY MASS INDEX: 26.66 KG/M2 | DIASTOLIC BLOOD PRESSURE: 65 MMHG | RESPIRATION RATE: 16 BRPM | OXYGEN SATURATION: 95 % | HEART RATE: 57 BPM | HEIGHT: 68 IN | WEIGHT: 175.93 LBS

## 2023-10-25 PROBLEM — N39.0 ACUTE UTI: Status: RESOLVED | Noted: 2023-10-23 | Resolved: 2023-10-25

## 2023-10-25 PROBLEM — R78.81 BACTEREMIA: Status: RESOLVED | Noted: 2023-10-23 | Resolved: 2023-10-25

## 2023-10-25 LAB
ANION GAP SERPL CALC-SCNC: 11 MMOL/L (ref 7–16)
BACTERIA UR CULT: NORMAL
BUN SERPL-MCNC: 15 MG/DL (ref 8–22)
CALCIUM SERPL-MCNC: 9 MG/DL (ref 8.5–10.5)
CHLORIDE SERPL-SCNC: 103 MMOL/L (ref 96–112)
CO2 SERPL-SCNC: 24 MMOL/L (ref 20–33)
CREAT SERPL-MCNC: 0.77 MG/DL (ref 0.5–1.4)
EKG IMPRESSION: NORMAL
ERYTHROCYTE [DISTWIDTH] IN BLOOD BY AUTOMATED COUNT: 43.8 FL (ref 35.9–50)
GFR SERPLBLD CREATININE-BSD FMLA CKD-EPI: 101 ML/MIN/1.73 M 2
GLUCOSE SERPL-MCNC: 99 MG/DL (ref 65–99)
HCT VFR BLD AUTO: 46.2 % (ref 42–52)
HGB BLD-MCNC: 15.1 G/DL (ref 14–18)
MCH RBC QN AUTO: 29.7 PG (ref 27–33)
MCHC RBC AUTO-ENTMCNC: 32.7 G/DL (ref 32.3–36.5)
MCV RBC AUTO: 90.8 FL (ref 81.4–97.8)
PLATELET # BLD AUTO: 324 K/UL (ref 164–446)
PMV BLD AUTO: 9 FL (ref 9–12.9)
POTASSIUM SERPL-SCNC: 4.3 MMOL/L (ref 3.6–5.5)
RBC # BLD AUTO: 5.09 M/UL (ref 4.7–6.1)
SIGNIFICANT IND 70042: NORMAL
SITE SITE: NORMAL
SODIUM SERPL-SCNC: 138 MMOL/L (ref 135–145)
SOURCE SOURCE: NORMAL
WBC # BLD AUTO: 7.2 K/UL (ref 4.8–10.8)

## 2023-10-25 PROCEDURE — 93005 ELECTROCARDIOGRAM TRACING: CPT | Performed by: INTERNAL MEDICINE

## 2023-10-25 PROCEDURE — RXMED WILLOW AMBULATORY MEDICATION CHARGE: Performed by: INTERNAL MEDICINE

## 2023-10-25 PROCEDURE — 36415 COLL VENOUS BLD VENIPUNCTURE: CPT

## 2023-10-25 PROCEDURE — A9270 NON-COVERED ITEM OR SERVICE: HCPCS | Performed by: STUDENT IN AN ORGANIZED HEALTH CARE EDUCATION/TRAINING PROGRAM

## 2023-10-25 PROCEDURE — 93010 ELECTROCARDIOGRAM REPORT: CPT | Performed by: INTERNAL MEDICINE

## 2023-10-25 PROCEDURE — 700111 HCHG RX REV CODE 636 W/ 250 OVERRIDE (IP): Mod: JZ | Performed by: INTERNAL MEDICINE

## 2023-10-25 PROCEDURE — 700105 HCHG RX REV CODE 258: Performed by: INTERNAL MEDICINE

## 2023-10-25 PROCEDURE — 85027 COMPLETE CBC AUTOMATED: CPT

## 2023-10-25 PROCEDURE — 99255 IP/OBS CONSLTJ NEW/EST HI 80: CPT | Performed by: INTERNAL MEDICINE

## 2023-10-25 PROCEDURE — 99239 HOSP IP/OBS DSCHRG MGMT >30: CPT | Performed by: INTERNAL MEDICINE

## 2023-10-25 PROCEDURE — 700102 HCHG RX REV CODE 250 W/ 637 OVERRIDE(OP): Performed by: STUDENT IN AN ORGANIZED HEALTH CARE EDUCATION/TRAINING PROGRAM

## 2023-10-25 PROCEDURE — 80048 BASIC METABOLIC PNL TOTAL CA: CPT

## 2023-10-25 RX ORDER — TAMSULOSIN HYDROCHLORIDE 0.4 MG/1
0.4 CAPSULE ORAL
Qty: 30 CAPSULE | Refills: 0 | Status: SHIPPED | OUTPATIENT
Start: 2023-10-26

## 2023-10-25 RX ORDER — LEVOTHYROXINE SODIUM 0.1 MG/1
100 TABLET ORAL
Qty: 10 TABLET | Refills: 0 | Status: SHIPPED | OUTPATIENT
Start: 2023-10-26 | End: 2023-11-05

## 2023-10-25 RX ORDER — LEVOTHYROXINE SODIUM 0.1 MG/1
100 TABLET ORAL
Qty: 30 TABLET | Refills: 0 | Status: SHIPPED | OUTPATIENT
Start: 2023-10-26 | End: 2023-10-25 | Stop reason: SDUPTHER

## 2023-10-25 RX ORDER — LEVOFLOXACIN 750 MG/1
750 TABLET, FILM COATED ORAL DAILY
Qty: 14 TABLET | Refills: 0 | Status: ACTIVE | OUTPATIENT
Start: 2023-10-25 | End: 2023-11-08

## 2023-10-25 RX ADMIN — CEFEPIME 2 G: 2 INJECTION, POWDER, FOR SOLUTION INTRAVENOUS at 04:20

## 2023-10-25 RX ADMIN — TAMSULOSIN HYDROCHLORIDE 0.4 MG: 0.4 CAPSULE ORAL at 08:06

## 2023-10-25 RX ADMIN — LEVOTHYROXINE SODIUM 100 MCG: 0.1 TABLET ORAL at 04:19

## 2023-10-25 ASSESSMENT — ENCOUNTER SYMPTOMS
ABDOMINAL PAIN: 0
MYALGIAS: 0
WEAKNESS: 0
CHILLS: 0
NAUSEA: 0
SPUTUM PRODUCTION: 0
FEVER: 0
NERVOUS/ANXIOUS: 0
COUGH: 0
DIARRHEA: 0
DOUBLE VISION: 0
FLANK PAIN: 0
CONSTIPATION: 0
SHORTNESS OF BREATH: 0
BLURRED VISION: 0
VOMITING: 0

## 2023-10-25 ASSESSMENT — PATIENT HEALTH QUESTIONNAIRE - PHQ9
SUM OF ALL RESPONSES TO PHQ9 QUESTIONS 1 AND 2: 0
2. FEELING DOWN, DEPRESSED, IRRITABLE, OR HOPELESS: NOT AT ALL
1. LITTLE INTEREST OR PLEASURE IN DOING THINGS: NOT AT ALL

## 2023-10-25 ASSESSMENT — PAIN DESCRIPTION - PAIN TYPE: TYPE: ACUTE PAIN

## 2023-10-25 NOTE — CARE PLAN
The patient is Stable - Low risk of patient condition declining or worsening    Shift Goals  Clinical Goals: iv abx  Patient Goals: rest  Family Goals: poc    Progress made toward(s) clinical / shift goals:  no falls'; iv abx    Patient is not progressing towards the following goals:

## 2023-10-25 NOTE — PROGRESS NOTES
Hospital Medicine Daily Progress Note    Date of Service  10/24/2023    Chief Complaint  Doyle Villa is a 63 y.o. male admitted 10/23/2023 with evaluation of bacteremia     Hospital Course  Doyle Villa is a 63 y.o. male with past medical history of hypothyroidism who presented 10/23/2023 for evaluation of bacteremia.  Patient presented on 10/22 to ED with UTI and urine retention.  Patient was discharged in the ED on Burch and levofloxacin.  Today he received call from pharmacist for bacteremia blood culture growing gram-negative rose.  Patient does report of dysuria.  Denies fever, chest pain, cough, shortness of breath.     On arrival to ED his vitals remained stable.  Labs reviewed noted normal white count, UA positive for UTI. 1/2 blood culture growing Serratia marcescens.  CT renal noted with enlarged prostate,Fat stranding surrounding the seminal vesicles.    Interval Problem Update  Patient reports improvement of his symptoms.  His urine culture is growing Serratia, sensitivity report is pending.  Continue IV cefepime at this time.  Patient's TSH was elevated at 13.2 and free T4 low at 0.83, patient has been started on Synthroid 100 mcg daily.  2D echo reveals LVEF 65% with no significant valvular abnormalities.  Definite vegetations are not identified.  Continue to follow blood cultures.  Plan of care discussed with patient and wife at bedside.    I have discussed this patient's plan of care and discharge plan at IDT rounds today with Case Management, Nursing, Nursing leadership, and other members of the IDT team.    Consultants/Specialty  infectious disease    Code Status  Full Code    Disposition  The patient is not medically cleared for discharge to home or a post-acute facility.      I have placed the appropriate orders for post-discharge needs.    Review of Systems  Review of Systems   Constitutional:  Positive for malaise/fatigue.        Physical Exam  Temp:  [36.1 °C (97 °F)-36.6  °C (97.8 °F)] 36.4 °C (97.5 °F)  Pulse:  [53-60] 53  Resp:  [18] 18  BP: (122-136)/(70-78) 133/78  SpO2:  [95 %-96 %] 95 %    Physical Exam  Vitals and nursing note reviewed.   Constitutional:       General: He is not in acute distress.  HENT:      Head: Normocephalic.      Mouth/Throat:      Mouth: Mucous membranes are moist.   Eyes:      Pupils: Pupils are equal, round, and reactive to light.   Cardiovascular:      Rate and Rhythm: Normal rate and regular rhythm.      Pulses: Normal pulses.      Heart sounds: Normal heart sounds.   Pulmonary:      Effort: Pulmonary effort is normal.      Breath sounds: Normal breath sounds.   Abdominal:      Palpations: Abdomen is soft.      Tenderness: There is no abdominal tenderness.   Musculoskeletal:         General: No swelling.      Cervical back: Neck supple.   Skin:     General: Skin is warm.      Coloration: Skin is not jaundiced.   Neurological:      General: No focal deficit present.      Mental Status: He is alert and oriented to person, place, and time.   Psychiatric:         Mood and Affect: Mood normal.         Behavior: Behavior normal.         Fluids    Intake/Output Summary (Last 24 hours) at 10/24/2023 1735  Last data filed at 10/24/2023 1100  Gross per 24 hour   Intake 351.16 ml   Output 1000 ml   Net -648.84 ml       Laboratory  Recent Labs     10/22/23  0412 10/23/23  1111 10/24/23  0358   WBC 9.2 7.0 6.3   RBC 4.71 5.26 4.94   HEMOGLOBIN 14.4 15.9 14.7   HEMATOCRIT 42.2 48.0 45.1   MCV 89.6 91.3 91.3   MCH 30.6 30.2 29.8   MCHC 34.1 33.1 32.6   RDW 44.0 45.0 44.7   PLATELETCT 293 320 303   MPV 10.1 9.4 9.4     Recent Labs     10/22/23  0412 10/23/23  1111 10/24/23  0358   SODIUM 137 138 141   POTASSIUM 4.1 4.2 4.7   CHLORIDE 105 104 107   CO2 22 22 25   GLUCOSE 113* 123* 108*   BUN 17 17 17   CREATININE 0.56 0.72 0.77   CALCIUM 9.1 9.6 9.1                   Imaging  EC-ECHOCARDIOGRAM COMPLETE W/O CONT   Final Result           Assessment/Plan  * Bacteremia-  (present on admission)  Assessment & Plan  1/2 blood culture growing Serratia marcescens.  Continue IV fluid  Continue IV cefepime  Follow repeat blood culture      Hypothyroidism  Assessment & Plan  Started on levothyroxine  TSH elevated at 13.2    Prostate enlargement  Assessment & Plan  Currently on Burch  Added Flomax  Voiding trial during hospitalization    Acute UTI  Assessment & Plan  Continue IV cefepime  Follow urine culture  Requiring IV narcotics for pain management  Monitor for respiratory while on IV narcotics         VTE prophylaxis:     I have performed a physical exam and reviewed and updated ROS and Plan today (10/24/2023). In review of yesterday's note (10/23/2023), there are no changes except as documented above.    I spent 55 minutes, reviewing the chart, obtaining and/or reviewing separately obtained history. Performing a medically appropriate examination and evaluation.  Counseling and educating the patient. Ordering and reviewing medications, tests, or procedures. Documenting clinical information in EPIC. Independently interpreting results and communicating results to patient. Discussing future disposition of care with patient, RN and case management.

## 2023-10-25 NOTE — CONSULTS
Consults  INFECTIOUS DISEASES INPATIENT CONSULT NOTE     Date of Service: 10/25/2023    Consult Requested By: Wayne Erickson M.D.    Reason for Consultation: Gram-negative rose bacteremia    History of Present Illness:   Doyle Villa is a 63 y.o.  admitted 10/23/2023. Pt has a past medical history of hypothyroidism who had initially presented to the ER on 10/22 due to urine retention and thought to have UTI via UA.  No urine culture was sent.  He was discharged with a Burch and on the levofloxacin.  Patient then with late growth of bacteria from blood culture and he was called by pharmacist to come back into the hospital.    Hospital Course:   The patient has been afebrile, no leukocytosis.  Doing well with no complaints    Review Of Systems:  Review of Systems   Constitutional:  Negative for chills, fever and malaise/fatigue.   HENT:  Negative for hearing loss.    Eyes:  Negative for blurred vision and double vision.   Respiratory:  Negative for cough, sputum production and shortness of breath.    Cardiovascular:  Negative for chest pain.   Gastrointestinal:  Negative for abdominal pain, constipation, diarrhea, nausea and vomiting.   Genitourinary:  Negative for dysuria, flank pain, frequency, hematuria and urgency.   Musculoskeletal:  Negative for myalgias.   Neurological:  Negative for weakness.   Psychiatric/Behavioral:  The patient is not nervous/anxious.        PMH:   History reviewed. No pertinent past medical history.    PSH:  History reviewed. No pertinent surgical history.    FAMILY HX:  No family history on file.  Reviewed family history. No pertinent family history.     SOCIAL HX:  Social History     Socioeconomic History    Marital status:      Spouse name: Not on file    Number of children: Not on file    Years of education: Not on file    Highest education level: Not on file   Occupational History    Not on file   Tobacco Use    Smoking status: Every Day     Current packs/day: 0.50      Average packs/day: 0.5 packs/day for 15.0 years (7.5 ttl pk-yrs)     Types: Cigarettes     Start date: 10/10/2008    Smokeless tobacco: Never   Substance and Sexual Activity    Alcohol use: Yes     Alcohol/week: 2.4 oz     Types: 4 Cans of beer per week    Drug use: Never    Sexual activity: Not on file   Other Topics Concern    Not on file   Social History Narrative    Not on file     Social Determinants of Health     Financial Resource Strain: Not on file   Food Insecurity: Not on file   Transportation Needs: Not on file   Physical Activity: Not on file   Stress: Not on file   Social Connections: Not on file   Intimate Partner Violence: Not on file   Housing Stability: Not on file     Social History     Tobacco Use   Smoking Status Every Day    Current packs/day: 0.50    Average packs/day: 0.5 packs/day for 15.0 years (7.5 ttl pk-yrs)    Types: Cigarettes    Start date: 10/10/2008   Smokeless Tobacco Never     Social History     Substance and Sexual Activity   Alcohol Use Yes    Alcohol/week: 2.4 oz    Types: 4 Cans of beer per week       Allergies/Intolerances:  No Known Allergies    History reviewed with the patient and /or family member, chart & primary care team    Other Current Medications:    Current Facility-Administered Medications:     senna-docusate (Pericolace Or Senokot S) 8.6-50 MG per tablet 2 Tablet, 2 Tablet, Oral, BID, 2 Tablet at 10/24/23 0441 **AND** polyethylene glycol/lytes (Miralax) PACKET 1 Packet, 1 Packet, Oral, QDAY PRN **AND** magnesium hydroxide (Milk Of Magnesia) suspension 30 mL, 30 mL, Oral, QDAY PRN **AND** bisacodyl (Dulcolax) suppository 10 mg, 10 mg, Rectal, QDAY PRN, Raphael Hendrix M.D.    enoxaparin (Lovenox) inj 40 mg, 40 mg, Subcutaneous, DAILY AT 1800, Raphael Hendrxi M.D., 40 mg at 10/23/23 1841    acetaminophen (Tylenol) tablet 650 mg, 650 mg, Oral, Q6HRS PRN, Raphael Hendrix M.D.    labetalol (Normodyne/Trandate) injection 10 mg, 10 mg, Intravenous, Q4HRS PRN, Raphael Hendrix M.D.    " ondansetron (Zofran) syringe/vial injection 4 mg, 4 mg, Intravenous, Q4HRS PRN, Raphael Hendrix M.D.    ondansetron (Zofran ODT) dispertab 4 mg, 4 mg, Oral, Q4HRS PRN, Raphael Hendrix M.D.    promethazine (Phenergan) tablet 12.5-25 mg, 12.5-25 mg, Oral, Q4HRS PRN, Raphael Hendrix M.D.    promethazine (Phenergan) suppository 12.5-25 mg, 12.5-25 mg, Rectal, Q4HRS PRN, Raphael Hendrix M.D.    prochlorperazine (Compazine) injection 5-10 mg, 5-10 mg, Intravenous, Q4HRS PRN, Raphael Hendrix M.D.    tamsulosin (Flomax) capsule 0.4 mg, 0.4 mg, Oral, AFTER BREAKFAST, Raphael Hendrix M.D., 0.4 mg at 10/25/23 0806    levothyroxine (Synthroid) tablet 100 mcg, 100 mcg, Oral, AM ES, Raphael Hendrix M.D., 100 mcg at 10/25/23 0419    HYDROmorphone (Dilaudid) injection 0.5 mg, 0.5 mg, Intravenous, Q6HRS PRN, Raphael Hendrix M.D.    cefepime (Maxipime) 2 g in  mL IVPB, 2 g, Intravenous, BID, Lizeth Beatty M.D., Stopped at 10/25/23 0450  [unfilled]    Most Recent Vital Signs:  /65   Pulse (!) 57   Temp 36.1 °C (97 °F) (Temporal)   Resp 16   Ht 1.727 m (5' 8\")   Wt 79.8 kg (175 lb 14.8 oz)   SpO2 95%   BMI 26.75 kg/m²   Temp  Av.3 °C (97.4 °F)  Min: 36.1 °C (97 °F)  Max: 36.7 °C (98 °F)    Physical Exam:  Physical Exam  Constitutional:       Appearance: Normal appearance.   HENT:      Head: Normocephalic and atraumatic.      Right Ear: External ear normal.      Left Ear: External ear normal.      Nose: Nose normal.      Mouth/Throat:      Mouth: Mucous membranes are dry.      Pharynx: Oropharynx is clear.   Eyes:      Extraocular Movements: Extraocular movements intact.      Conjunctiva/sclera: Conjunctivae normal.      Pupils: Pupils are equal, round, and reactive to light.   Cardiovascular:      Rate and Rhythm: Normal rate and regular rhythm.      Heart sounds: Normal heart sounds.   Pulmonary:      Effort: Pulmonary effort is normal.      Breath sounds: Normal breath sounds.   Abdominal:      General: Abdomen is " "flat. Bowel sounds are normal.      Palpations: Abdomen is soft.   Musculoskeletal:         General: Normal range of motion.      Cervical back: Normal range of motion and neck supple.   Skin:     General: Skin is warm and dry.   Neurological:      General: No focal deficit present.      Mental Status: He is alert and oriented to person, place, and time.   Psychiatric:         Mood and Affect: Mood normal.         Behavior: Behavior normal.           Pertinent Lab Results:  Recent Labs     10/23/23  1111 10/24/23  0358 10/25/23  0619   WBC 7.0 6.3 7.2      Recent Labs     10/23/23  1111 10/24/23  0358 10/25/23  0619   HEMOGLOBIN 15.9 14.7 15.1   HEMATOCRIT 48.0 45.1 46.2   MCV 91.3 91.3 90.8   MCH 30.2 29.8 29.7   PLATELETCT 320 303 324         Recent Labs     10/23/23  1111 10/24/23  0358 10/25/23  0619   SODIUM 138 141 138   POTASSIUM 4.2 4.7 4.3   CHLORIDE 104 107 103   CO2 22 25 24   CREATININE 0.72 0.77 0.77        Recent Labs     10/23/23  1111   ALBUMIN 4.4        Pertinent Micro:  Results       Procedure Component Value Units Date/Time    URINE CULTURE(NEW) [[id number]] Collected: 10/23/23 1202    Order Status: Completed Specimen: Urine Updated: 10/25/23 0641     Significant Indicator NEG     Source UR     Site -     Culture Result No growth at 48 hours.    Narrative:      Release to patient->Immediate  Indication for culture:->Emergency Room Patient  Indication for culture:->Emergency Room Patient    BLOOD CULTURE [[id number]] Collected: 10/23/23 1111    Order Status: Completed Specimen: Blood from Peripheral Updated: 10/24/23 0804     Significant Indicator NEG     Source BLD     Site PERIPHERAL     Culture Result No Growth  Note: Blood cultures are incubated for 5 days and  are monitored continuously.Positive blood cultures  are called to the RN and reported as soon as  they are identified.      Narrative:      Per Hospital Policy: Only change Specimen Src: to \"Line\" if  specified by physician " "order.  Release to patient->Immediate  No site indicated    BLOOD CULTURE [[id number]] Collected: 10/23/23 1231    Order Status: Completed Specimen: Blood from Peripheral Updated: 10/24/23 0804     Significant Indicator NEG     Source BLD     Site PERIPHERAL     Culture Result No Growth  Note: Blood cultures are incubated for 5 days and  are monitored continuously.Positive blood cultures  are called to the RN and reported as soon as  they are identified.      Narrative:      Per Hospital Policy: Only change Specimen Src: to \"Line\" if  specified by physician order.  Release to patient->Immediate  Right Hand    URINALYSIS [[id number]]  (Abnormal) Collected: 10/23/23 1202    Order Status: Completed Specimen: Urine Updated: 10/23/23 1244     Color Yellow     Character Cloudy     Specific Gravity 1.028     Ph 5.0     Glucose Negative mg/dL      Ketones Trace mg/dL      Protein 30 mg/dL      Bilirubin Negative     Urobilinogen, Urine 0.2     Nitrite Negative     Leukocyte Esterase Negative     Occult Blood Large     Micro Urine Req Microscopic    Narrative:      Release to patient->Immediate  Indication for culture:->Emergency Room Patient          No results found for: \"BLOODCULTU\", \"BLDCULT\", \"BCHOLD\"     Studies:  EC-ECHOCARDIOGRAM COMPLETE W/O CONT    Result Date: 10/23/2023  Transthoracic Echo Report Echocardiography Laboratory CONCLUSIONS No prior study is available for comparison. Normal left ventricular systolic function. The left ventricular ejection fraction is visually estimated to be 65%. No significant valvular abnormalities. Definite vegetations are not identified, failure to do so does not exclude their presence or the diagnosis of endocarditis, if clinically indicated, a transesophageal study would be useful. VENANCIO SHIN Exam Date:         10/23/2023                    12:06 Exam Location:     Inpatient Priority:          Routine Ordering Physician:        GERALDINE BLACKMAN Referring Physician:       " [id number], [facility] Sonographer:               Mauri Arshad [facility] Age:    63     Gender:    M MRN:    [id number] :    1960 BSA:    1.97   Ht (in):    68     Wt (lb):    184 Exam Type:     Complete Indications:     Endocarditis ICD Codes:       421 CPT Codes:       04915 BP:   131    /   76     HR:   60 Technical Quality:       Fair MEASUREMENTS  (Male / Female) Normal Values 2D ECHO LV Diastolic Diameter PLAX        5.1 cm                4.2 - 5.9 / 3.9 - 5.3 cm LV Systolic Diameter PLAX         3.3 cm                2.1 - 4.0 cm IVS Diastolic Thickness           0.9 cm                LVPW Diastolic Thickness          0.8 cm                LVOT Diameter                     2 cm                  Estimated LV Ejection Fraction    65 %                  LV Ejection Fraction MOD BP       63.4 %                >= 55  % LV Ejection Fraction MOD 4C       58.9 %                LV Ejection Fraction MOD 2C       69.4 %                LV Ejection Fraction 4C AL        60 %                  LV Ejection Fraction 2C AL        70.9 %                LA Volume Index                   23 cm3/m2             16 - 28 cm3/m2 DOPPLER AV Peak Velocity                  1.2 m/s               AV Peak Gradient                  5.8 mmHg              AV Mean Gradient                  3 mmHg                LVOT Peak Velocity                0.81 m/s              AV Area Cont Eq vti               2.2 cm2               Mitral E Point Velocity           0.52 m/s              Mitral E to A Ratio               0.89                  MV Pressure Half Time             50 ms                 MV Area PHT                       4.4 cm2               MV Deceleration Time              169 ms                PV Peak Velocity                  0.71 m/s              PV Peak Gradient                  2 mmHg                RVOT Peak Velocity                0.55 m/s              LV E' Lateral Velocity            13.4 cm/s             Mitral E to LV E' Lateral Ratio   3.9                    LV E' Septal Velocity             9.7 cm/s              Mitral E to LV E' Septal Ratio    5.4                   * Indicates values subject to auto-interpretation LV EF:  65    % FINDINGS Left Ventricle Normal left ventricular chamber size. Normal left ventricular wall thickness. Normal left ventricular systolic function. The left ventricular ejection fraction is visually estimated to be 65%. The ejection fraction is measured to be  63% by Jerry's biplane. Normal diastolic function. Right Ventricle Normal right ventricular size and systolic function. Right Atrium Normal right atrial size. Normal inferior vena cava size and inspiratory collapse. Left Atrium Normal left atrial size. Left atrial volume index is 21 mL/sq m. Mitral Valve Structurally normal mitral valve. No mitral stenosis. Trace mitral regurgitation. Aortic Valve Structurally normal aortic valve without significant stenosis or regurgitation. Tricuspid Valve Structurally normal tricuspid valve without significant stenosis or regurgitation. Right atrial pressure is estimated to be 3 mmHg. Unable to estimate right ventricular systolic pressure due to an inadequate tricuspid regurgitant jet. Pulmonic Valve The pulmonic valve is not well visualized. Pericardium Normal pericardium without effusion. Aorta Normal aortic root for body surface area. The ascending aorta diameter is  3.2cm. Tobi Washburn M.D. (Electronically Signed) Final Date:     23 October 2023                 13:36    CT-RENAL COLIC EVALUATION(A/P W/O)    Result Date: 10/22/2023  10/22/2023 5:07 AM HISTORY/REASON FOR EXAM:  URINARY RETENTION new onset, no prior history TECHNIQUE/EXAM DESCRIPTION AND NUMBER OF VIEWS: CT scan renal/colic without contrast. 5 mm helical images of the abdomen and pelvis were obtained from the diaphragmatic domes through the pubic symphysis. Low dose optimization technique was utilized for this CT exam including automated exposure control and  adjustment of the mA and/or kV according to patient size. COMPARISON: None. FINDINGS: Renal stone: No urinary tract calculus identified. The bilateral kidneys appear unremarkable. Lung Bases: No pulmonary nodules at the lung bases. No pleural or pericardial fluid. Small hiatal hernia. Abdomen: Within the limits of noncontrast technique, the liver, spleen, pancreas, and adrenal glands are unremarkable in appearance. The gallbladder is unremarkable There is no biliary dilatation. There is no bowel wall thickening or abnormal dilatation. The abdominal aorta is normal in caliber. Pelvis: Enlarged prostate. Fat stranding surrounding the seminal vesicles. Small fat-containing right inguinal hernia. Partially decompressed urinary bladder. No wall thickening. No lymph node enlargement. No free fluid, or free air in the abdomen or pelvis. No aggressive bone lesions are seen. _____________________________________     1. No urinary tract calculus identified. No renal collecting system dilatation. 2. Enlarged prostate. Fat stranding surrounding the seminal vesicles. Correlate for infection or inflammation. 3. Partially decompressed urinary bladder. No wall thickening.      ASSESSMENT/PLAN:      63 y.o.  admitted 10/23/2023. Pt has a past medical history of hypothyroidism who had initially presented to the ER on 10/22 due to urine retention and thought to have UTI via UA.  No urine culture was sent.  He was discharged with a Burch and on the levofloxacin.  Patient then with late growth of bacteria from blood culture and he was called by pharmacist to come back into the hospital.    Hospital Course:   The patient has been afebrile, no leukocytosis.  Doing well with no complaints    Problem List    Bacteremia, presumed urinary source  -Blood cultures on 10/22 + Serratia marcescens, some resistance but sensitive to cefepime and fluoroquinolones  -TTE was performed with no vegetations noted, no significant valvular disease, EF  65%  Urinary retention, Burch in place  Hypothyroidism    Assessment:      -Notes were reviewed from primary team, radiology, ED, surgery, specialists, etc.   -Reviewed labs to date, microbiology for current admit and prior  -Imaging was independently reviewed and interpreted    Plan:  Recommendations for antibiotics:   --- Continue cefepime while inpatient, on discharge okay to transition back to levofloxacin 750 mg daily will recommend total antibiotic course of 14 days, end 11/5/23     Recommendations for further/ongoing work-up:   --- Follow-up repeat blood cultures to final, no growth to date  --- Burch catheter remains in place, defer to primary team/specialists    Recommendations for monitoring of clinical status and drug toxicity:   --- Patient appears to be tolerating cefepime and had tolerated levofloxacin  --- Please discuss fluoroquinolone management with the patient prior to discharge:  Medication should not be taken with multi vitamins.  Monitor blood sugars closely if diabetic. Discuss medication with health care professional if new joint or tendon pain. Seek medical attention if experience multiple watery bowel movements.   EKG on 10/25   QTc:412 -acceptable      Dispo: Okay for discharge from ID perspective      Plan of care discussed with ANTHONY Erickson M.D.. Will continue to follow    Susu Sheets M.D.

## 2023-10-25 NOTE — DISCHARGE SUMMARY
Discharge Summary    CHIEF COMPLAINT ON ADMISSION  Chief Complaint   Patient presents with    Groin Pain     Pt to triage with complaints of worsening groin pain. Pt reports to have Urology follow up but pain worse and was sent to [facility] for further eval.        Reason for Admission  Groin Pain     Admission Date  10/23/2023    CODE STATUS  Full Code    HPI & HOSPITAL COURSE  Doyle Villa is a 63 y.o. male with past medical history of hypothyroidism who presented 10/23/2023 for evaluation of bacteremia.  Patient presented on 10/22 to ED with UTI and urine retention.  Patient was discharged in the ED on Burch and levofloxacin.  He received a call from pharmacist for bacteremia blood culture growing gram-negative rose.  Patient does report of dysuria.  Denies fever, chest pain, cough, shortness of breath.     On arrival to ED his vitals remained stable.  Labs reviewed noted normal white count, UA positive for UTI. 1/2 blood culture growing Serratia marcescens with some resistance but sensitive to cefepime and fluoroquinolones.  CT renal noted with enlarged prostate,Fat stranding surrounding the seminal vesicles. 2d echo was negative for vegetations. His symptoms improved. ID was consulted and recommended Levaquin 750 mg till 11/5/23. Burch was removed and patient was able to void.  He was also noted to be hypothyroid with TSH of 13.2 and low free T4 of 0.83. Started on levothyroxine. He was instructed to follow up with his PCP.        Therefore, he is discharged in good and stable condition to home with close outpatient follow-up.    The patient met 2-midnight criteria for an inpatient stay at the time of discharge.    Discharge Date  10/25/23    FOLLOW UP ITEMS POST DISCHARGE  PCP    DISCHARGE DIAGNOSES  Principal Problem (Resolved):    Bacteremia (POA: Yes)  Active Problems:    Prostate enlargement (POA: Unknown)    Hypothyroidism (POA: Unknown)  Resolved Problems:    Acute UTI (POA: Unknown)      FOLLOW  UP  No future appointments.  No follow-up provider specified.    MEDICATIONS ON DISCHARGE     Medication List        START taking these medications        Instructions   levothyroxine 100 MCG Tabs  Start taking on: October 26, 2023  Commonly known as: Synthroid   Take 1 Tablet by mouth every morning on an empty stomach for 10 days.  Dose: 100 mcg     tamsulosin 0.4 MG capsule  Start taking on: October 26, 2023  Commonly known as: Flomax   Take 1 Capsule by mouth 1/2 hour after breakfast.  Dose: 0.4 mg            CHANGE how you take these medications        Instructions   levoFLOXacin 750 MG tablet  What changed:   medication strength  how much to take  Commonly known as: Levaquin   [place] 1 tableta por vía oral diariamente por 14 días.  (Take 1 Tablet by mouth every day for 14 days.)  Dose: 750 mg            CONTINUE taking these medications        Instructions   Non Formulary Request   Take 1 Tablet by mouth every day. **NOVOTIROL 50 mcg Rx from [facility]**  Dose: 1 Tablet              Allergies  No Known Allergies    DIET  Orders Placed This Encounter   Procedures    Diet Order Diet: Regular     Standing Status:   Standing     Number of Occurrences:   1     Order Specific Question:   Diet:     Answer:   Regular [1]       ACTIVITY  As tolerated.  Weight bearing as tolerated    CONSULTATIONS  ID Dr Sheets    PROCEDURES  none    LABORATORY  Lab Results   Component Value Date    SODIUM 138 10/25/2023    POTASSIUM 4.3 10/25/2023    CHLORIDE 103 10/25/2023    CO2 24 10/25/2023    GLUCOSE 99 10/25/2023    BUN 15 10/25/2023    CREATININE 0.77 10/25/2023        Lab Results   Component Value Date    WBC 7.2 10/25/2023    HEMOGLOBIN 15.1 10/25/2023    HEMATOCRIT 46.2 10/25/2023    PLATELETCT 324 10/25/2023        Total time of the discharge process exceeds 32 minutes.

## 2023-10-27 LAB
BACTERIA BLD CULT: NORMAL
SIGNIFICANT IND 70042: NORMAL
SITE SITE: NORMAL
SOURCE SOURCE: NORMAL

## 2023-10-28 LAB
BACTERIA BLD CULT: NORMAL
BACTERIA BLD CULT: NORMAL
SIGNIFICANT IND 70042: NORMAL
SIGNIFICANT IND 70042: NORMAL
SITE SITE: NORMAL
SITE SITE: NORMAL
SOURCE SOURCE: NORMAL
SOURCE SOURCE: NORMAL

## 2024-03-02 ENCOUNTER — HOSPITAL ENCOUNTER (EMERGENCY)
Facility: MEDICAL CENTER | Age: 64
End: 2024-03-02
Attending: EMERGENCY MEDICINE

## 2024-03-02 VITALS
HEIGHT: 68 IN | BODY MASS INDEX: 30.07 KG/M2 | RESPIRATION RATE: 14 BRPM | WEIGHT: 198.41 LBS | TEMPERATURE: 97.5 F | OXYGEN SATURATION: 94 % | DIASTOLIC BLOOD PRESSURE: 76 MMHG | SYSTOLIC BLOOD PRESSURE: 122 MMHG | HEART RATE: 69 BPM

## 2024-03-02 DIAGNOSIS — N39.0 ACUTE UTI: ICD-10-CM

## 2024-03-02 LAB
APPEARANCE UR: CLEAR
BACTERIA #/AREA URNS HPF: ABNORMAL /HPF
BILIRUB UR QL STRIP.AUTO: NEGATIVE
COLOR UR: YELLOW
EPI CELLS #/AREA URNS HPF: NEGATIVE /HPF
GLUCOSE UR STRIP.AUTO-MCNC: NEGATIVE MG/DL
HYALINE CASTS #/AREA URNS LPF: ABNORMAL /LPF
KETONES UR STRIP.AUTO-MCNC: NEGATIVE MG/DL
LEUKOCYTE ESTERASE UR QL STRIP.AUTO: ABNORMAL
MICRO URNS: ABNORMAL
NITRITE UR QL STRIP.AUTO: POSITIVE
PH UR STRIP.AUTO: 5.5 [PH] (ref 5–8)
PROT UR QL STRIP: NEGATIVE MG/DL
RBC # URNS HPF: ABNORMAL /HPF
RBC UR QL AUTO: ABNORMAL
SP GR UR STRIP.AUTO: 1.02
UROBILINOGEN UR STRIP.AUTO-MCNC: 0.2 MG/DL
WBC #/AREA URNS HPF: ABNORMAL /HPF

## 2024-03-02 PROCEDURE — A9270 NON-COVERED ITEM OR SERVICE: HCPCS | Performed by: EMERGENCY MEDICINE

## 2024-03-02 PROCEDURE — 700102 HCHG RX REV CODE 250 W/ 637 OVERRIDE(OP): Performed by: EMERGENCY MEDICINE

## 2024-03-02 PROCEDURE — 99283 EMERGENCY DEPT VISIT LOW MDM: CPT

## 2024-03-02 PROCEDURE — 87086 URINE CULTURE/COLONY COUNT: CPT

## 2024-03-02 PROCEDURE — 81001 URINALYSIS AUTO W/SCOPE: CPT

## 2024-03-02 PROCEDURE — 87186 SC STD MICRODIL/AGAR DIL: CPT

## 2024-03-02 PROCEDURE — 87077 CULTURE AEROBIC IDENTIFY: CPT

## 2024-03-02 RX ORDER — SULFAMETHOXAZOLE AND TRIMETHOPRIM 800; 160 MG/1; MG/1
1 TABLET ORAL 2 TIMES DAILY
Qty: 14 TABLET | Refills: 0 | Status: ACTIVE | OUTPATIENT
Start: 2024-03-02 | End: 2024-03-09

## 2024-03-02 RX ORDER — SULFAMETHOXAZOLE AND TRIMETHOPRIM 800; 160 MG/1; MG/1
1 TABLET ORAL ONCE
Status: COMPLETED | OUTPATIENT
Start: 2024-03-02 | End: 2024-03-02

## 2024-03-02 RX ADMIN — SULFAMETHOXAZOLE AND TRIMETHOPRIM 1 TABLET: 800; 160 TABLET ORAL at 13:50

## 2024-03-02 ASSESSMENT — FIBROSIS 4 INDEX: FIB4 SCORE: 0.91

## 2024-03-02 NOTE — ED TRIAGE NOTES
"Chief Complaint   Patient presents with    Urinary Pain     Starting yesterday, burning with urination, same symptom 5 months ago and was told he had a UTI, denies recent fevers or other symptoms      Pt ambulatory to triage for above complaints, VSS on RA, gCS 15, NAD. Pt denies abd pain.     [facility] Educ[facility] on [facility]age process and to inform staff of any changes.     BP (!) 149/90   Pulse 85   Temp 36.4 °C (97.6 °F) (Temporal)   Resp 16   Ht 1.727 m (5' 8\")   Wt 90 kg (198 lb 6.6 oz)   SpO2 97%   BMI 30.17 kg/m²     "

## 2024-03-02 NOTE — DISCHARGE INSTRUCTIONS
You are given your first dose of antibiotics here in the emergency department today.  You do need another dose tonight.  In its twice a day for 7 days.  If for any reason, you are unable to tolerate your antibiotics please return to the emergency department.  And as discussed, if you develop a fever, pain, nausea vomiting or any new concerning symptoms, please return for reevaluation.

## 2024-03-02 NOTE — ED PROVIDER NOTES
ED Provider Note    CHIEF COMPLAINT  Chief Complaint   Patient presents with    Urinary Pain     Starting yesterday, burning with urination, same symptom 5 months ago and was told he had a UTI, denies recent fevers or other symptoms        EXTERNAL RECORDS REVIEWED  Patient's last encounter was a hospital admission in October of last year.  He spent 2 days here with an enlarged prostate, bacteremia, UTI and hypothyroidism.  Blood cultures showed Serratia marcescens with some resistance but sensitivity to cefepime and fluoroquinolones.    HPI/ROS  LIMITATION TO HISTORY   Select: : None  OUTSIDE HISTORIAN(S):  Family wife, sister    Doyle Villa is a 64 y.o. male who presents accompanied by his wife as well as his sister.  Patient states he has had burning with urine all night last night, it kept him up.  He denies a fever cough or cold symptoms.  No nausea or vomiting.  No back pain.  No diarrhea.  He is otherwise healthy and takes no medications other than the thyroid medication.  He is not diabetic.  He does not have high blood pressure.  He states he came in now because 5 months ago, he had similar symptoms and waited and he had to be hospitalized for urine infection he wanted to avoid that.  No blood in his urine.  Wife states that he does take medication, levothyroxine for his thyroid.    PAST MEDICAL HISTORY   Thyroid disease    SURGICAL HISTORY  patient denies any surgical history    FAMILY HISTORY  No family history on file.    SOCIAL HISTORY  Social History     Tobacco Use    Smoking status: Every Day     Current packs/day: 0.50     Average packs/day: 0.5 packs/day for 15.4 years (7.7 ttl pk-yrs)     Types: Cigarettes     Start date: 10/10/2008    Smokeless tobacco: Never   Substance and Sexual Activity    Alcohol use: Yes     Alcohol/week: 2.4 oz     Types: 4 Cans of beer per week    Drug use: Never    Sexual activity: Not on file       CURRENT MEDICATIONS  Home Medications       Reviewed by  "Lazarus Crockett R.N. (Registered Nurse) on 03/02/24 at 1147  Med List Status: Not Addressed     Medication Last Dose Status   Non Formulary Request  Active   tamsulosin (FLOMAX) 0.4 MG capsule  Active                    ALLERGIES  No Known Allergies    PHYSICAL EXAM  VITAL SIGNS: /79   Pulse 70   Temp 36.4 °C (97.5 °F) (Temporal)   Resp 14   Ht 1.727 m (5' 8\")   Wt 90 kg (198 lb 6.6 oz)   SpO2 94%   BMI 30.17 kg/m²    Vitals reviewed.  Constitutional: Patient is oriented to person, place, and time. Appears well-developed and well-nourished. No distress.    Head: Normocephalic and atraumatic.   Mouth/Throat: Oropharynx is clear  Eyes: Conjunctivae are normal.  Neck: Normal range of motion.  Cardiovascular: Normal rate, regular rhythm and normal heart sounds.  Pulmonary/Chest: Effort normal and breath sounds normal. No respiratory distress, no wheezes, rhonchi, or rales.   Abdominal: Soft. Bowel sounds are normal. There is no tenderness, rebound or guarding, or peritoneal signs. No CVA tenderness.  Musculoskeletal: No edema and no tenderness.   Neurological: Normal  gait. No focal deficits.   Skin: Skin is warm and dry. No erythema. No pallor.   Psychiatric: Patient has a normal mood and affect.     DIAGNOSTIC STUDIES / PROCEDURES    LABS  Results for orders placed or performed during the hospital encounter of 03/02/24   Urinalysis, Culture if Indicated    Specimen: Urine, Clean Catch   Result Value Ref Range    Color Yellow     Character Clear     Specific Gravity 1.016 <1.035    Ph 5.5 5.0 - 8.0    Glucose Negative Negative mg/dL    Ketones Negative Negative mg/dL    Protein Negative Negative mg/dL    Bilirubin Negative Negative    Urobilinogen, Urine 0.2 Negative    Nitrite Positive (A) Negative    Leukocyte Esterase Moderate (A) Negative    Occult Blood Moderate (A) Negative    Micro Urine Req Microscopic    URINE MICROSCOPIC (W/UA)   Result Value Ref Range    WBC  (A) /hpf    RBC 10-20 (A) " /hpf    Bacteria Moderate (A) None /hpf    Epithelial Cells Negative /hpf    Hyaline Cast 0-2 /lpf     RADIOLOGY    COURSE & MEDICAL DECISION MAKING    ED Observation Status? No; Patient does not meet criteria for ED Observation.     INITIAL ASSESSMENT, COURSE AND PLAN  Care Narrative:     This is a pleasant 64-year-old male who presents with just hours, of burning with urination.  He is wanting to avoid hospitalization so he came in as soon as he had symptoms.  He has no other systemic symptoms.  No fever.  No body aches.  No abdominal pain.  No nausea or vomiting.  No blood in his urine.  Urine has been collected and sent for evaluation.    1:18 PM patient is reevaluated at the bedside.  Per discussion with pharmacy, given previous blood cultures, positive for Serratia marcescens will initiate Bactrim therapy, based on prior sensitivities.  He had did not have positive urine cultures previously only positive blood cultures.  Patient is instructed on signs and symptoms to watch for.  He will be given a first dose of Bactrim here and discharged home with a week long course.  He is well-appearing overall at this time with reassuring vital signs.  Anticipate discharge to home shortly.      DISPOSITION AND DISCUSSIONS  I have discussed management of the patient with the following physicians and LITTLE's:  None    Discussion of management with other QHP or appropriate source(s): Pharmacy re: ABX therapy      Escalation of care considered, and ultimately not performed: None    Barriers to care at this time, including but not limited to: None.     Decision tools and prescription drugs considered including, but not limited to: None    FINAL DIAGNOSIS  1. Acute UTI           Electronically signed by: Renee Schilling D.O., 3/2/2024 12:26 PM

## 2024-03-02 NOTE — LETTER
3/4/2024               Doyle Villa  [address] [place] [address]        Dear Doyle (MR#[id number])    This letter is sent in regards to your recent visit to the [facility] Emergency Department on 3/2/2024. During the visit, tests were performed to assist the physician in your medical diagnosis. A review of your tests requires that we notify you of the following:    Your urine culture was POSITIVE for a bacteria called Klebsiella aerogenes. The antibiotic prescribed for you (sulfamethoxazole-trimethoprim) should be active to treat this bacteria. It is important that you continue taking your antibiotic until it is finished.     Please feel free to contact me at the number below if you have any questions or concerns. Thank you for your cooperation in the matter.    Sincerely,  ED Culture Follow-Up Staff  Josué Kevin, PharmD    [facility], Emergency Department  [address] [place] [id number] [phone] (ED Culture Line)

## 2024-03-04 LAB
BACTERIA UR CULT: ABNORMAL
BACTERIA UR CULT: ABNORMAL
SIGNIFICANT IND 70042: ABNORMAL
SITE SITE: ABNORMAL
SOURCE SOURCE: ABNORMAL

## 2024-03-04 NOTE — ED NOTES
"ED Positive Culture Follow-up/Notification Note:    Date: 3/4/24     Patient seen in the ED on 3/2/2024 for evaluation of burning with urine since last night. Per ED provider note, \"He denies a fever cough or cold symptoms. No nausea or vomiting. No back pain. No diarrhea. He is otherwise healthy and takes no medications other than the thyroid medication. He is not diabetic. He does not have high blood pressure. He states he came in now because 5 months ago, he had similar symptoms and waited and he had to be hospitalized for urine infection he wanted to avoid that. No blood in his urine. Wife states that he does take medication, levothyroxine for his thyroid.\"  1. Acute UTI       Discharge Medication List as of 3/2/2024  1:46 PM        START taking these medications    Details   sulfamethoxazole-trimethoprim (BACTRIM DS) 800-160 MG tablet Take 1 Tablet by mouth 2 times a day for 7 days., Disp-14 Tablet, R-0, Normal             Allergies: Patient has no known allergies.     Vitals:    03/02/24 1213 03/02/24 1235 03/02/24 1305 03/02/24 1335   BP: (!) 172/104 128/79 138/81 122/76   Pulse: 82 70 70 69   Resp: 14 14 14 14   Temp: 36.4 °C (97.5 °F)      TempSrc: Temporal      SpO2: 95% 94% 93% 94%   Weight:       Height:           Final cultures:   Results       Procedure Component Value Units Date/Time    URINE CULTURE(NEW) [[id number]]  (Abnormal)  (Susceptibility) Collected: 03/02/24 1211    Order Status: Completed Specimen: Urine Updated: 03/04/24 0732     Significant Indicator POS     Source UR     Site -     Culture Result -      Klebsiella aerogenes  >100,000 cfu/mL      Narrative:      Indication for culture:->Patient WITHOUT an indwelling Burch  catheter in place with new onset of Dysuria, Frequency,  Urgency, and/or Suprapubic pain    Susceptibility       Klebsiella aerogenes (1)       Antibiotic Interpretation Microscan   Method Status    Amoxicillin/CA Resistant >16/8 mcg/mL ALEX Final    Aztreonam  [*]  " Sensitive <=4 mcg/mL ALEX Final    Ceftriaxone Sensitive <=1 mcg/mL ALEX Final    Ceftazidime  [*]  Sensitive <=1 mcg/mL ALEX Final    Ciprofloxacin Sensitive <=0.25 mcg/mL ALEX Final    Cefepime Sensitive <=2 mcg/mL ALEX Final    Cefuroxime Sensitive <=4 mcg/mL ALEX Final    Ceftazidime+Avibactam  [*]  Sensitive <=4 mcg/mL ALEX Final    Ertapenem  [*]  Sensitive <=0.5 mcg/mL ALEX Final    Nitrofurantoin Intermediate 64 mcg/mL ALEX Final    Amikacin  [*]  Sensitive <=16 mcg/mL ALEX Final    Gentamicin Sensitive <=2 mcg/mL ALEX Final    Tobramycin Sensitive <=2 mcg/mL ALEX Final    Imipenem  [*]  Sensitive <=1 mcg/mL ALEX Final    Levofloxacin Sensitive <=0.5 mcg/mL ALEX Final    Meropenem  [*]  Sensitive <=1 mcg/mL ALEX Final    Meropenem/Vaborbactam  [*]  Sensitive <=2 mcg/mL ALEX Final    Minocycline Sensitive <=4 mcg/mL ALEX Final    Pip/Tazobactam Sensitive <=8 mcg/mL ALEX Final    Trimeth/Sulfa Sensitive <=0.5/9.5 mcg/mL ALEX Final    Tetracycline  [*]  Sensitive <=4 mcg/mL ALEX Final    Tigecycline Sensitive <=2 mcg/mL ALEX Final               [*]  Suppressed Antibiotic                   Urinalysis, Culture if Indicated [[id number]]  (Abnormal) Collected: 03/02/24 1211    Order Status: Completed Specimen: Urine, Clean Catch Updated: 03/02/24 1248     Color Yellow     Character Clear     Specific Gravity 1.016     Ph 5.5     Glucose Negative mg/dL      Ketones Negative mg/dL      Protein Negative mg/dL      Bilirubin Negative     Urobilinogen, Urine 0.2     Nitrite Positive     Leukocyte Esterase Moderate     Occult Blood Moderate     Micro Urine Req Microscopic    Narrative:      Indication for culture:->Patient WITHOUT an indwelling Burch  catheter in place with new onset of Dysuria, Frequency,  Urgency, and/or Suprapubic pain            Plan:   Urine culture positive for Klebsiella aerogenes sensitive to sulfamethoxazole-trimethoprim (Bactrim).  Appropriate antibiotic therapy prescribed. No changes required based upon culture  result.  Sent letter to patient to notify of positive culture result and encourage compliance with prescribed antibiotics.     Josué Kevin, PharmD

## 2024-05-19 ENCOUNTER — HOSPITAL ENCOUNTER (EMERGENCY)
Facility: MEDICAL CENTER | Age: 64
End: 2024-05-19
Attending: EMERGENCY MEDICINE

## 2024-05-19 ENCOUNTER — APPOINTMENT (OUTPATIENT)
Dept: RADIOLOGY | Facility: MEDICAL CENTER | Age: 64
End: 2024-05-19
Attending: EMERGENCY MEDICINE

## 2024-05-19 VITALS
TEMPERATURE: 97.9 F | HEART RATE: 53 BPM | DIASTOLIC BLOOD PRESSURE: 77 MMHG | SYSTOLIC BLOOD PRESSURE: 135 MMHG | BODY MASS INDEX: 32.07 KG/M2 | RESPIRATION RATE: 16 BRPM | WEIGHT: 192.46 LBS | HEIGHT: 65 IN | OXYGEN SATURATION: 95 %

## 2024-05-19 DIAGNOSIS — K59.00 CONSTIPATION, UNSPECIFIED CONSTIPATION TYPE: ICD-10-CM

## 2024-05-19 DIAGNOSIS — R10.9 ABDOMINAL PAIN, UNSPECIFIED ABDOMINAL LOCATION: ICD-10-CM

## 2024-05-19 LAB
ALBUMIN SERPL BCP-MCNC: 4.4 G/DL (ref 3.2–4.9)
ALBUMIN/GLOB SERPL: 1.5 G/DL
ALP SERPL-CCNC: 85 U/L (ref 30–99)
ALT SERPL-CCNC: 23 U/L (ref 2–50)
ANION GAP SERPL CALC-SCNC: 15 MMOL/L (ref 7–16)
APPEARANCE UR: CLEAR
AST SERPL-CCNC: 27 U/L (ref 12–45)
BACTERIA #/AREA URNS HPF: NEGATIVE /HPF
BASOPHILS # BLD AUTO: 0.5 % (ref 0–1.8)
BASOPHILS # BLD: 0.06 K/UL (ref 0–0.12)
BILIRUB SERPL-MCNC: 0.7 MG/DL (ref 0.1–1.5)
BILIRUB UR QL STRIP.AUTO: NEGATIVE
BUN SERPL-MCNC: 20 MG/DL (ref 8–22)
CALCIUM ALBUM COR SERPL-MCNC: 9.1 MG/DL (ref 8.5–10.5)
CALCIUM SERPL-MCNC: 9.4 MG/DL (ref 8.5–10.5)
CHLORIDE SERPL-SCNC: 103 MMOL/L (ref 96–112)
CO2 SERPL-SCNC: 20 MMOL/L (ref 20–33)
COLOR UR: YELLOW
CREAT SERPL-MCNC: 0.75 MG/DL (ref 0.5–1.4)
EOSINOPHIL # BLD AUTO: 0.08 K/UL (ref 0–0.51)
EOSINOPHIL NFR BLD: 0.7 % (ref 0–6.9)
EPI CELLS #/AREA URNS HPF: NEGATIVE /HPF
ERYTHROCYTE [DISTWIDTH] IN BLOOD BY AUTOMATED COUNT: 43.9 FL (ref 35.9–50)
GFR SERPLBLD CREATININE-BSD FMLA CKD-EPI: 101 ML/MIN/1.73 M 2
GLOBULIN SER CALC-MCNC: 3 G/DL (ref 1.9–3.5)
GLUCOSE SERPL-MCNC: 102 MG/DL (ref 65–99)
GLUCOSE UR STRIP.AUTO-MCNC: NEGATIVE MG/DL
HCT VFR BLD AUTO: 44 % (ref 42–52)
HGB BLD-MCNC: 15.2 G/DL (ref 14–18)
HYALINE CASTS #/AREA URNS LPF: ABNORMAL /LPF
IMM GRANULOCYTES # BLD AUTO: 0.04 K/UL (ref 0–0.11)
IMM GRANULOCYTES NFR BLD AUTO: 0.4 % (ref 0–0.9)
KETONES UR STRIP.AUTO-MCNC: NEGATIVE MG/DL
LEUKOCYTE ESTERASE UR QL STRIP.AUTO: NEGATIVE
LIPASE SERPL-CCNC: 28 U/L (ref 11–82)
LYMPHOCYTES # BLD AUTO: 1.94 K/UL (ref 1–4.8)
LYMPHOCYTES NFR BLD: 17.3 % (ref 22–41)
MCH RBC QN AUTO: 31.3 PG (ref 27–33)
MCHC RBC AUTO-ENTMCNC: 34.5 G/DL (ref 32.3–36.5)
MCV RBC AUTO: 90.7 FL (ref 81.4–97.8)
MICRO URNS: ABNORMAL
MONOCYTES # BLD AUTO: 0.77 K/UL (ref 0–0.85)
MONOCYTES NFR BLD AUTO: 6.9 % (ref 0–13.4)
NEUTROPHILS # BLD AUTO: 8.31 K/UL (ref 1.82–7.42)
NEUTROPHILS NFR BLD: 74.2 % (ref 44–72)
NITRITE UR QL STRIP.AUTO: NEGATIVE
NRBC # BLD AUTO: 0 K/UL
NRBC BLD-RTO: 0 /100 WBC (ref 0–0.2)
PH UR STRIP.AUTO: 6 [PH] (ref 5–8)
PLATELET # BLD AUTO: 356 K/UL (ref 164–446)
PMV BLD AUTO: 9.9 FL (ref 9–12.9)
POTASSIUM SERPL-SCNC: 4.2 MMOL/L (ref 3.6–5.5)
PROT SERPL-MCNC: 7.4 G/DL (ref 6–8.2)
PROT UR QL STRIP: NEGATIVE MG/DL
RBC # BLD AUTO: 4.85 M/UL (ref 4.7–6.1)
RBC # URNS HPF: ABNORMAL /HPF
RBC UR QL AUTO: ABNORMAL
SODIUM SERPL-SCNC: 138 MMOL/L (ref 135–145)
SP GR UR STRIP.AUTO: 1.01
UROBILINOGEN UR STRIP.AUTO-MCNC: 0.2 MG/DL
WBC # BLD AUTO: 11.2 K/UL (ref 4.8–10.8)
WBC #/AREA URNS HPF: ABNORMAL /HPF

## 2024-05-19 RX ORDER — DOCUSATE SODIUM 100 MG/1
100 CAPSULE, LIQUID FILLED ORAL 2 TIMES DAILY
Qty: 60 CAPSULE | Refills: 0 | Status: SHIPPED | OUTPATIENT
Start: 2024-05-19

## 2024-05-19 ASSESSMENT — LIFESTYLE VARIABLES: DO YOU DRINK ALCOHOL: NO

## 2024-05-19 ASSESSMENT — FIBROSIS 4 INDEX: FIB4 SCORE: 0.91

## 2024-05-19 NOTE — ED PROVIDER NOTES
"ED Provider Note    CHIEF COMPLAINT  Chief Complaint   Patient presents with    LLQ Pain       EXTERNAL RECORDS REVIEWED  No previous notes    HPI/ROS  LIMITATION TO HISTORY   Syriac language,  used  OUTSIDE HISTORIAN(S):  No bleeding disorders    Doyle Villa is a 64 y.o. male who presents here for evaluation of abdominal pain.  Patient states he has had left lower quadrant abdominal pain over the last few hours, but states his last bowel movement was at 5 in the morning.  Patient states while here, he passed gas a few times, which resolved all of his abdominal discomfort.  He has no chest pain, shortness of breath, fever or chills, or headache.  Patient states he feels back to normal.    PAST MEDICAL HISTORY   has a past medical history of Enlarged prostate and Hypothyroid.    SURGICAL HISTORY  patient denies any surgical history    FAMILY HISTORY  History reviewed. No pertinent family history.    SOCIAL HISTORY  Social History     Tobacco Use    Smoking status: Former     Current packs/day: 0.50     Average packs/day: 0.5 packs/day for 15.6 years (7.8 ttl pk-yrs)     Types: Cigarettes     Start date: 10/10/2008    Smokeless tobacco: Never   Substance and Sexual Activity    Alcohol use: Yes     Alcohol/week: 2.4 oz     Types: 4 Cans of beer per week     Comment: occ    Drug use: Never    Sexual activity: Not on file       CURRENT MEDICATIONS  Home Medications    **Home medications have not yet been reviewed for this encounter**         ALLERGIES  No Known Allergies    PHYSICAL EXAM  VITAL SIGNS: /84   Pulse 61   Temp 36.3 °C (97.4 °F) (Temporal)   Resp 16   Ht 1.651 m (5' 5\")   Wt 87.3 kg (192 lb 7.4 oz)   SpO2 96%   BMI 32.03 kg/m²    Constitutional: Well developed, well nourished.  Mild acute distress.  HEENT: Normocephalic, atraumatic. Posterior pharynx clear and moist.  Eyes:  EOMI. Normal sclera.  Neck: Supple, Full range of motion, nontender.  Chest/Pulmonary: clear to " ausculation. Symmetrical expansion.   Cardio: Regular rate and rhythm with no murmur.   Abdomen: Soft, nontender. No peritoneal signs. No guarding.  No pulsatile masses  Musculoskeletal: No deformity, no edema, neurovascular intact.   Neuro: Clear speech, appropriate, cooperative, cranial nerves II-XII grossly intact.  Psych: Normal mood and affect      EKG/LABS  Results for orders placed or performed during the hospital encounter of 05/19/24   CBC WITH DIFFERENTIAL   Result Value Ref Range    WBC 11.2 (H) 4.8 - 10.8 K/uL    RBC 4.85 4.70 - 6.10 M/uL    Hemoglobin 15.2 14.0 - 18.0 g/dL    Hematocrit 44.0 42.0 - 52.0 %    MCV 90.7 81.4 - 97.8 fL    MCH 31.3 27.0 - 33.0 pg    MCHC 34.5 32.3 - 36.5 g/dL    RDW 43.9 35.9 - 50.0 fL    Platelet Count 356 164 - 446 K/uL    MPV 9.9 9.0 - 12.9 fL    Neutrophils-Polys 74.20 (H) 44.00 - 72.00 %    Lymphocytes 17.30 (L) 22.00 - 41.00 %    Monocytes 6.90 0.00 - 13.40 %    Eosinophils 0.70 0.00 - 6.90 %    Basophils 0.50 0.00 - 1.80 %    Immature Granulocytes 0.40 0.00 - 0.90 %    Nucleated RBC 0.00 0.00 - 0.20 /100 WBC    Neutrophils (Absolute) 8.31 (H) 1.82 - 7.42 K/uL    Lymphs (Absolute) 1.94 1.00 - 4.80 K/uL    Monos (Absolute) 0.77 0.00 - 0.85 K/uL    Eos (Absolute) 0.08 0.00 - 0.51 K/uL    Baso (Absolute) 0.06 0.00 - 0.12 K/uL    Immature Granulocytes (abs) 0.04 0.00 - 0.11 K/uL    NRBC (Absolute) 0.00 K/uL   COMP METABOLIC PANEL   Result Value Ref Range    Sodium 138 135 - 145 mmol/L    Potassium 4.2 3.6 - 5.5 mmol/L    Chloride 103 96 - 112 mmol/L    Co2 20 20 - 33 mmol/L    Anion Gap 15.0 7.0 - 16.0    Glucose 102 (H) 65 - 99 mg/dL    Bun 20 8 - 22 mg/dL    Creatinine 0.75 0.50 - 1.40 mg/dL    Calcium 9.4 8.5 - 10.5 mg/dL    Correct Calcium 9.1 8.5 - 10.5 mg/dL    AST(SGOT) 27 12 - 45 U/L    ALT(SGPT) 23 2 - 50 U/L    Alkaline Phosphatase 85 30 - 99 U/L    Total Bilirubin 0.7 0.1 - 1.5 mg/dL    Albumin 4.4 3.2 - 4.9 g/dL    Total Protein 7.4 6.0 - 8.2 g/dL    Globulin  3.0 1.9 - 3.5 g/dL    A-G Ratio 1.5 g/dL   LIPASE   Result Value Ref Range    Lipase 28 11 - 82 U/L   URINALYSIS    Specimen: Urine   Result Value Ref Range    Color Yellow     Character Clear     Specific Gravity 1.006 <1.035    Ph 6.0 5.0 - 8.0    Glucose Negative Negative mg/dL    Ketones Negative Negative mg/dL    Protein Negative Negative mg/dL    Bilirubin Negative Negative    Urobilinogen, Urine 0.2 Negative    Nitrite Negative Negative    Leukocyte Esterase Negative Negative    Occult Blood Small (A) Negative    Micro Urine Req Microscopic    URINE MICROSCOPIC (W/UA)   Result Value Ref Range    WBC 0-2 (A) /hpf    RBC 0-2 (A) /hpf    Bacteria Negative None /hpf    Epithelial Cells Negative /hpf    Hyaline Cast 0-2 /lpf   ESTIMATED GFR   Result Value Ref Range    GFR (CKD-EPI) 101 >60 mL/min/1.73 m 2         RADIOLOGY/PROCEDURES   I have independently interpreted the diagnostic imaging associated with this visit and am waiting the final reading from the radiologist.   My preliminary interpretation is as follows: see below     Radiologist interpretation:  [id number] VIEW   Final Result      Nonobstructive bowel gas pattern. Moderate fecal loading in the colon.          COURSE & MEDICAL DECISION MAKING    ASSESSMENT, COURSE AND PLAN  Care Narrative: This is a 61-year-old male here for evaluation of constipation.  Patient stated that he had left-sided abdominal pain, and after passing a lot of gas here, the pain completely resolved.  He does have a moderate amount of stool noted.  On reexam at 4:15 PM, he has no pain to palpation, and no chest pain or shortness of breath, and no other medical issues.  At this time we will have him follow-up, or return for any further issues or concerns    DISPOSITION AND DISCUSSIONS  I have discussed management of the patient with the following physicians and LITTLE's: None none    Discussion of management with other QHP or appropriate source(s): None    Escalation of care  considered, and ultimately not performed: IV fluids not indicated    Barriers to care at this time, including but not limited to: Patient does not have established PCP.     Decision tools and prescription drugs considered including, but not limited to: None.    FINAL DIAGNOSIS  Constipation       Electronically signed by: Eben Swanson D.O., 5/19/2024 2:39 PM       Statement Selected

## 2024-05-19 NOTE — ED NOTES
Pt ambulated to [facility], agree with triage note.  Pt providing urine sample. Pt blood drawn in waiting room.  Family at bedside assisting with translation.  Pt changing into gown.  ERP to see.

## 2024-05-19 NOTE — ED NOTES
Discharge instructions given.  All questions answered.  Prescriptions given x1 and pickup OTC Magnesium Citrate and take as directed.  Pt to follow-up with PCP, return to ER if symptoms worsen as discussed.  Pt verbalized understanding.  All belongings with pt.  Pt ambulated to lobby.

## 2024-05-19 NOTE — ED TRIAGE NOTES
Ambulatory to triage w/ c/o LLQ pain since 0500.  Reports difficulty having a BM after pain started.  Denies pain currently.